# Patient Record
Sex: MALE | Race: WHITE | NOT HISPANIC OR LATINO | Employment: FULL TIME | URBAN - METROPOLITAN AREA
[De-identification: names, ages, dates, MRNs, and addresses within clinical notes are randomized per-mention and may not be internally consistent; named-entity substitution may affect disease eponyms.]

---

## 2020-10-04 ENCOUNTER — APPOINTMENT (OUTPATIENT)
Dept: CT IMAGING | Facility: CLINIC | Age: 25
End: 2020-10-04
Attending: EMERGENCY MEDICINE
Payer: COMMERCIAL

## 2020-10-04 ENCOUNTER — HOSPITAL ENCOUNTER (EMERGENCY)
Facility: CLINIC | Age: 25
Discharge: HOME | End: 2020-10-04
Attending: EMERGENCY MEDICINE | Admitting: EMERGENCY MEDICINE
Payer: COMMERCIAL

## 2020-10-04 ENCOUNTER — HOSPITAL ENCOUNTER (EMERGENCY)
Facility: CLINIC | Age: 25
Discharge: STILL ADMITTED OR ADMINISTRATIVE | End: 2020-10-04
Attending: EMERGENCY MEDICINE

## 2020-10-04 VITALS
OXYGEN SATURATION: 98 % | HEIGHT: 70 IN | TEMPERATURE: 98.8 F | WEIGHT: 191.8 LBS | SYSTOLIC BLOOD PRESSURE: 127 MMHG | RESPIRATION RATE: 10 BRPM | HEART RATE: 94 BPM | BODY MASS INDEX: 27.46 KG/M2 | DIASTOLIC BLOOD PRESSURE: 77 MMHG

## 2020-10-04 DIAGNOSIS — F10.920 ALCOHOLIC INTOXICATION WITHOUT COMPLICATION (H): ICD-10-CM

## 2020-10-04 DIAGNOSIS — S00.03XA CONTUSION OF FACE, SCALP AND NECK, INITIAL ENCOUNTER: ICD-10-CM

## 2020-10-04 DIAGNOSIS — S10.93XA CONTUSION OF FACE, SCALP AND NECK, INITIAL ENCOUNTER: ICD-10-CM

## 2020-10-04 DIAGNOSIS — S00.83XA CONTUSION OF FACE, SCALP AND NECK, INITIAL ENCOUNTER: ICD-10-CM

## 2020-10-04 LAB
ANION GAP SERPL CALCULATED.3IONS-SCNC: 11 MMOL/L (ref 3–14)
BASOPHILS # BLD AUTO: 0 10E9/L (ref 0–0.2)
BASOPHILS NFR BLD AUTO: 0.2 %
BUN SERPL-MCNC: 30 MG/DL (ref 7–30)
CALCIUM SERPL-MCNC: 7.7 MG/DL (ref 8.5–10.1)
CHLORIDE SERPL-SCNC: 110 MMOL/L (ref 94–109)
CO2 SERPL-SCNC: 20 MMOL/L (ref 20–32)
CREAT SERPL-MCNC: 1.13 MG/DL (ref 0.66–1.25)
DIFFERENTIAL METHOD BLD: ABNORMAL
EOSINOPHIL # BLD AUTO: 0.1 10E9/L (ref 0–0.7)
EOSINOPHIL NFR BLD AUTO: 1 %
ERYTHROCYTE [DISTWIDTH] IN BLOOD BY AUTOMATED COUNT: 13.3 % (ref 10–15)
ETHANOL SERPL-MCNC: 0.24 G/DL
GFR SERPL CREATININE-BSD FRML MDRD: 90 ML/MIN/{1.73_M2}
GLUCOSE SERPL-MCNC: 130 MG/DL (ref 70–99)
HCT VFR BLD AUTO: 43.7 % (ref 40–53)
HGB BLD-MCNC: 14.8 G/DL (ref 13.3–17.7)
IMM GRANULOCYTES # BLD: 0.1 10E9/L (ref 0–0.4)
IMM GRANULOCYTES NFR BLD: 0.4 %
LYMPHOCYTES # BLD AUTO: 4 10E9/L (ref 0.8–5.3)
LYMPHOCYTES NFR BLD AUTO: 31.6 %
MCH RBC QN AUTO: 30.3 PG (ref 26.5–33)
MCHC RBC AUTO-ENTMCNC: 33.9 G/DL (ref 31.5–36.5)
MCV RBC AUTO: 90 FL (ref 78–100)
MONOCYTES # BLD AUTO: 0.8 10E9/L (ref 0–1.3)
MONOCYTES NFR BLD AUTO: 6.5 %
NEUTROPHILS # BLD AUTO: 7.6 10E9/L (ref 1.6–8.3)
NEUTROPHILS NFR BLD AUTO: 60.3 %
NRBC # BLD AUTO: 0 10*3/UL
NRBC BLD AUTO-RTO: 0 /100
PLATELET # BLD AUTO: 322 10E9/L (ref 150–450)
POTASSIUM SERPL-SCNC: 3.3 MMOL/L (ref 3.4–5.3)
RBC # BLD AUTO: 4.88 10E12/L (ref 4.4–5.9)
SODIUM SERPL-SCNC: 141 MMOL/L (ref 133–144)
WBC # BLD AUTO: 12.6 10E9/L (ref 4–11)

## 2020-10-04 PROCEDURE — 250N000011 HC RX IP 250 OP 636: Performed by: EMERGENCY MEDICINE

## 2020-10-04 PROCEDURE — 272N000047 HC ADHESIVE DERMABOND SKIN

## 2020-10-04 PROCEDURE — 70486 CT MAXILLOFACIAL W/O DYE: CPT

## 2020-10-04 PROCEDURE — 70450 CT HEAD/BRAIN W/O DYE: CPT

## 2020-10-04 PROCEDURE — 80048 BASIC METABOLIC PNL TOTAL CA: CPT | Performed by: EMERGENCY MEDICINE

## 2020-10-04 PROCEDURE — 72125 CT NECK SPINE W/O DYE: CPT

## 2020-10-04 PROCEDURE — 96372 THER/PROPH/DIAG INJ SC/IM: CPT | Performed by: EMERGENCY MEDICINE

## 2020-10-04 PROCEDURE — 250N000009 HC RX 250

## 2020-10-04 PROCEDURE — 80320 DRUG SCREEN QUANTALCOHOLS: CPT | Performed by: EMERGENCY MEDICINE

## 2020-10-04 PROCEDURE — 96372 THER/PROPH/DIAG INJ SC/IM: CPT

## 2020-10-04 PROCEDURE — 99285 EMERGENCY DEPT VISIT HI MDM: CPT | Mod: 25

## 2020-10-04 PROCEDURE — 85025 COMPLETE CBC W/AUTO DIFF WBC: CPT | Performed by: EMERGENCY MEDICINE

## 2020-10-04 PROCEDURE — 12011 RPR F/E/E/N/L/M 2.5 CM/<: CPT

## 2020-10-04 RX ORDER — OLANZAPINE 10 MG/2ML
10 INJECTION, POWDER, FOR SOLUTION INTRAMUSCULAR ONCE
Status: COMPLETED | OUTPATIENT
Start: 2020-10-04 | End: 2020-10-04

## 2020-10-04 RX ORDER — METHYLCELLULOSE 4000CPS 30 %
POWDER (GRAM) MISCELLANEOUS ONCE
Status: DISCONTINUED | OUTPATIENT
Start: 2020-10-04 | End: 2020-10-04 | Stop reason: HOSPADM

## 2020-10-04 RX ORDER — WATER 10 ML/10ML
INJECTION INTRAMUSCULAR; INTRAVENOUS; SUBCUTANEOUS
Status: COMPLETED
Start: 2020-10-04 | End: 2020-10-04

## 2020-10-04 RX ADMIN — OLANZAPINE 10 MG: 10 INJECTION, POWDER, FOR SOLUTION INTRAMUSCULAR at 03:18

## 2020-10-04 RX ADMIN — WATER 2.1 ML: 1 INJECTION INTRAMUSCULAR; INTRAVENOUS; SUBCUTANEOUS at 03:18

## 2020-10-04 ASSESSMENT — MIFFLIN-ST. JEOR: SCORE: 1866.25

## 2020-10-04 NOTE — ED NOTES
"Went to pt room for morning vitals, assessment and breakfast plan. Pt was sitting on the edge of the bed awake and states \"I need to take a piss, the damn nurse won't let me go. My fucking knee hurts\" This RN allowed pt to ambulate to bathroom. Pt steady on feet and able to walk to bathroom w/out issues. MD notified of pt current status.  "
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
## 2020-10-04 NOTE — ED NOTES
"Patient laying in bed not interacting with anyone.  Then started crying.    Patient then started yelling \"FUCK!  FUCK!\"  Hitting the side rail of the ED cart with this right hand.  Yelling profanities at staff \"suck my cock, fuck you!\"  Motioning with his middle finger.   "
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
## 2020-10-04 NOTE — ED NOTES
"Patient placed into restraints.  Offered Zyprexa.  Patient yelling \"No!  I want benzos!  Give me some benzos!  I want to get knocked out man!\"  Attempted to explain to patient he can have the Zyprexa injection voluntarily or he would have to be restrained.  Patient not cooperative with either.  \"Go ahead, just fuckening do whatever man!  DO IT!\"  Then he would laugh loudly & yell profanities.  \"Come on!  Shoot me up!\"  5 point restraints placed without incident with multiple security, EDT, & RN.   "

## 2020-10-04 NOTE — ED AVS SNAPSHOT
[facility] Emergency Dept  [address] [address]  Phone: [phone]  Fax: [phone]                                    Benny Fofana   MRN: [id number]    Department: [facility] Emergency Dept   Date of Visit: 10/4/2020           After Visit Summary Signature Page    I have received my discharge instructions, and my questions have been answered. I have discussed any challenges I see with this plan with the nurse or doctor.    ..........................................................................................................................................  Patient/Patient Representative Signature      ..........................................................................................................................................  Patient Representative Print Name and Relationship to Patient    ..................................................               ................................................  Date                                   Time    ..........................................................................................................................................  Reviewed by Signature/Title    ...................................................              ..............................................  Date                                               Time          22EPIC Rev 08/18

## 2020-10-04 NOTE — ED NOTES
"Patient arguing about using urinal at side of bed & having someone assist him with standing.  Patient yelling \"no, I want to walk to the bathroom!  Or I will lay to the side & piss on the floor!  Me being a taxpayer will clean it up!\"  Attempted to explain to him why he is not being allowed to walk to the bathroom at this time.  Stand at side of bed only for now.  Patient continues to yell & threaten to \"piss on you & the floor\".      "
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
## 2020-10-04 NOTE — ED NOTES
Pt. provided with urinal. Pt. refusing to use urinal, would like to walk to the bathroom. RN notified.

## 2024-05-18 ENCOUNTER — APPOINTMENT (OUTPATIENT)
Dept: GENERAL RADIOLOGY | Facility: CLINIC | Age: 29
End: 2024-05-18
Attending: EMERGENCY MEDICINE

## 2024-05-18 ENCOUNTER — HOSPITAL ENCOUNTER (EMERGENCY)
Facility: CLINIC | Age: 29
Discharge: HOME | End: 2024-05-18
Attending: EMERGENCY MEDICINE | Admitting: EMERGENCY MEDICINE

## 2024-05-18 VITALS
BODY MASS INDEX: 28.44 KG/M2 | RESPIRATION RATE: 16 BRPM | DIASTOLIC BLOOD PRESSURE: 70 MMHG | TEMPERATURE: 97.4 F | OXYGEN SATURATION: 95 % | HEIGHT: 72 IN | WEIGHT: 210 LBS | HEART RATE: 85 BPM | SYSTOLIC BLOOD PRESSURE: 121 MMHG

## 2024-05-18 DIAGNOSIS — R07.9 ACUTE CHEST PAIN: ICD-10-CM

## 2024-05-18 DIAGNOSIS — F15.10 METHAMPHETAMINE USE (H): ICD-10-CM

## 2024-05-18 LAB
ALBUMIN SERPL BCG-MCNC: 4.6 G/DL (ref 3.5–5.2)
ALP SERPL-CCNC: 71 U/L (ref 40–150)
ALT SERPL W P-5'-P-CCNC: 47 U/L (ref 0–70)
ANION GAP SERPL CALCULATED.3IONS-SCNC: 13 MMOL/L (ref 7–15)
AST SERPL W P-5'-P-CCNC: 32 U/L (ref 0–45)
ATRIAL RATE - MUSE: 107 BPM
BASOPHILS # BLD AUTO: 0.1 10E3/UL (ref 0–0.2)
BASOPHILS NFR BLD AUTO: 0 %
BILIRUB SERPL-MCNC: 0.5 MG/DL
BUN SERPL-MCNC: 16.1 MG/DL (ref 6–20)
CALCIUM SERPL-MCNC: 9.1 MG/DL (ref 8.6–10)
CHLORIDE SERPL-SCNC: 101 MMOL/L (ref 98–107)
CREAT SERPL-MCNC: 1.16 MG/DL (ref 0.67–1.17)
DEPRECATED HCO3 PLAS-SCNC: 22 MMOL/L (ref 22–29)
DIASTOLIC BLOOD PRESSURE - MUSE: NORMAL MMHG
EGFRCR SERPLBLD CKD-EPI 2021: 88 ML/MIN/1.73M2
EOSINOPHIL # BLD AUTO: 0.1 10E3/UL (ref 0–0.7)
EOSINOPHIL NFR BLD AUTO: 1 %
ERYTHROCYTE [DISTWIDTH] IN BLOOD BY AUTOMATED COUNT: 13.3 % (ref 10–15)
GLUCOSE SERPL-MCNC: 102 MG/DL (ref 70–99)
HCT VFR BLD AUTO: 48 % (ref 40–53)
HGB BLD-MCNC: 16.4 G/DL (ref 13.3–17.7)
HOLD SPECIMEN: NORMAL
IMM GRANULOCYTES # BLD: 0 10E3/UL
IMM GRANULOCYTES NFR BLD: 0 %
INTERPRETATION ECG - MUSE: NORMAL
LYMPHOCYTES # BLD AUTO: 1.7 10E3/UL (ref 0.8–5.3)
LYMPHOCYTES NFR BLD AUTO: 14 %
MCH RBC QN AUTO: 29.5 PG (ref 26.5–33)
MCHC RBC AUTO-ENTMCNC: 34.2 G/DL (ref 31.5–36.5)
MCV RBC AUTO: 86 FL (ref 78–100)
MONOCYTES # BLD AUTO: 1.2 10E3/UL (ref 0–1.3)
MONOCYTES NFR BLD AUTO: 10 %
NEUTROPHILS # BLD AUTO: 8.7 10E3/UL (ref 1.6–8.3)
NEUTROPHILS NFR BLD AUTO: 75 %
NRBC # BLD AUTO: 0 10E3/UL
NRBC BLD AUTO-RTO: 0 /100
P AXIS - MUSE: 65 DEGREES
PLATELET # BLD AUTO: 315 10E3/UL (ref 150–450)
POTASSIUM SERPL-SCNC: 3.6 MMOL/L (ref 3.4–5.3)
PR INTERVAL - MUSE: 142 MS
PROT SERPL-MCNC: 7.4 G/DL (ref 6.4–8.3)
QRS DURATION - MUSE: 86 MS
QT - MUSE: 336 MS
QTC - MUSE: 448 MS
R AXIS - MUSE: 40 DEGREES
RBC # BLD AUTO: 5.56 10E6/UL (ref 4.4–5.9)
SODIUM SERPL-SCNC: 136 MMOL/L (ref 135–145)
SYSTOLIC BLOOD PRESSURE - MUSE: NORMAL MMHG
T AXIS - MUSE: 39 DEGREES
TROPONIN T SERPL HS-MCNC: 9 NG/L
VENTRICULAR RATE- MUSE: 107 BPM
WBC # BLD AUTO: 11.7 10E3/UL (ref 4–11)

## 2024-05-18 PROCEDURE — 71046 X-RAY EXAM CHEST 2 VIEWS: CPT

## 2024-05-18 PROCEDURE — 84484 ASSAY OF TROPONIN QUANT: CPT | Performed by: EMERGENCY MEDICINE

## 2024-05-18 PROCEDURE — 96360 HYDRATION IV INFUSION INIT: CPT

## 2024-05-18 PROCEDURE — 80053 COMPREHEN METABOLIC PANEL: CPT | Performed by: EMERGENCY MEDICINE

## 2024-05-18 PROCEDURE — 93005 ELECTROCARDIOGRAM TRACING: CPT

## 2024-05-18 PROCEDURE — 258N000003 HC RX IP 258 OP 636: Performed by: EMERGENCY MEDICINE

## 2024-05-18 PROCEDURE — 99285 EMERGENCY DEPT VISIT HI MDM: CPT | Mod: 25

## 2024-05-18 PROCEDURE — 36415 COLL VENOUS BLD VENIPUNCTURE: CPT | Performed by: EMERGENCY MEDICINE

## 2024-05-18 PROCEDURE — 85041 AUTOMATED RBC COUNT: CPT | Performed by: EMERGENCY MEDICINE

## 2024-05-18 RX ORDER — TELMISARTAN 80 MG/1
80 TABLET ORAL DAILY
COMMUNITY
Start: 2023-08-31 | End: 2024-08-30

## 2024-05-18 RX ORDER — ASPIRIN 81 MG/1
1 TABLET ORAL DAILY
COMMUNITY

## 2024-05-18 RX ADMIN — SODIUM CHLORIDE 1000 ML: 9 INJECTION, SOLUTION INTRAVENOUS at 03:47

## 2024-05-18 ASSESSMENT — COLUMBIA-SUICIDE SEVERITY RATING SCALE - C-SSRS
1. IN THE PAST MONTH, HAVE YOU WISHED YOU WERE DEAD OR WISHED YOU COULD GO TO SLEEP AND NOT WAKE UP?: NO
6. HAVE YOU EVER DONE ANYTHING, STARTED TO DO ANYTHING, OR PREPARED TO DO ANYTHING TO END YOUR LIFE?: NO
2. HAVE YOU ACTUALLY HAD ANY THOUGHTS OF KILLING YOURSELF IN THE PAST MONTH?: NO

## 2024-05-18 ASSESSMENT — ACTIVITIES OF DAILY LIVING (ADL)
ADLS_ACUITY_SCORE: 35
ADLS_ACUITY_SCORE: 35

## 2024-05-18 NOTE — ED TRIAGE NOTES
Pt reports tightness across chest, sob and palpitations since midnight. Pt also reports he was sober from meth for a year, but relapsed the past couple days and feels dehydrated     Triage Assessment (Adult)       Row Name 05/18/24 0323          Respiratory WDL    Respiratory WDL WDL        Cardiac WDL    Cardiac WDL X  tightness across chest        Cognitive/Neuro/Behavioral WDL    Cognitive/Neuro/Behavioral WDL WDL

## 2024-05-18 NOTE — ED PROVIDER NOTES
History   Chief Complaint:  Chest pain    HPI   Benny Fofana is a 28 year old male who presents for evaluation of chest pain.  He has a history of methamphetamine use, he was sober for a year but relapsed in the last few days, he injects it in his antecubital fossae.  He did this earlier in the evening, starting around 11 PM he developed chest pain in his left chest, it is worse when he moves his left arm across his chest or when he tries to lift his left arm up.  No fevers.  No trauma.  No leg swelling or history of DVT or PE.  No history of heart problems.  He has not taken any pain medication.  He states he was looking at his Apple watch while intoxicated on meth, he was puzzled by the tracings, though states that his heart rate was around 80.  No recent trouble breathing or cough.  No abdominal pain.  He states he has been through treatment programs for his meth use before and declines my offer of any resources in this regard.  No syncope.  He reports he has been compliant with his telmisartan for hypertension.  He is a [employer] supervisor.  He volunteers that he also uses testosterone.    Medications:    telmisartan (MICARDIS) 80 MG tablet  aspirin 81 MG EC tablet    Past Medical History:    Past Medical History:   Diagnosis Date    Hypertension      Physical Exam   Patient Vitals for the past 24 hrs:   BP Temp Pulse Resp SpO2 Height Weight   05/18/24 0429 121/70 -- 85 16 95 % -- --   05/18/24 0359 124/85 -- 83 23 93 % -- --   05/18/24 0344 117/67 -- 92 20 96 % -- --   05/18/24 0327 128/84 97.4  F (36.3  C) 98 16 99 % 1.829 m (6') 95.3 kg (210 lb)      Physical Exam  General: Nontoxic-appearing male recumbent in the gurney in room 12  HENT: mucous membranes moist  CV: rate as above, regular rhythm, no lower extremity edema, no JVD, palpable symmetric radial pulses, no murmur audible  Resp: normal effort, speaks in full phrases, no stridor, no cough observed  GI: abdomen soft and nontender, no guarding,  negative Menezes's sign  MSK: Mild tenderness to chest over the pectoral region, discomfort provoked with moving his left arm, no palpable shoulder or elbow effusion, no palpable bony deformity, no chest crepitus, left clavicle nontender  Skin: appropriately warm and dry, no erythema or vesicles to chest wall or left arm  Scars without evidence of infection to bilateral antecubital fossae  Neuro: alert, clear speech, oriented  Psych: cooperative, slightly anxious, no hallucinations    Emergency Department Course   Electrocardiogram  ECG taken at 0329, ECG interpreted at 0338 by HUMBERTO Shirley MD  Sinus rhythm  Rate 107 bpm. IA interval 142. QRS duration 86. QTc 448    Imaging:  XR Chest 2 Views   Final Result   IMPRESSION: Negative chest.         Laboratory:  Labs Ordered and Resulted from Time of ED Arrival to Time of ED Departure   COMPREHENSIVE METABOLIC PANEL - Abnormal       Result Value    Sodium 136      Potassium 3.6      Carbon Dioxide (CO2) 22      Anion Gap 13      Urea Nitrogen 16.1      Creatinine 1.16      GFR Estimate 88      Calcium 9.1      Chloride 101      Glucose 102 (*)     Alkaline Phosphatase 71      AST 32      ALT 47      Protein Total 7.4      Albumin 4.6      Bilirubin Total 0.5     CBC WITH PLATELETS AND DIFFERENTIAL - Abnormal    WBC Count 11.7 (*)     RBC Count 5.56      Hemoglobin 16.4      Hematocrit 48.0      MCV 86      MCH 29.5      MCHC 34.2      RDW 13.3      Platelet Count 315      % Neutrophils 75      % Lymphocytes 14      % Monocytes 10      % Eosinophils 1      % Basophils 0      % Immature Granulocytes 0      NRBCs per 100 WBC 0      Absolute Neutrophils 8.7 (*)     Absolute Lymphocytes 1.7      Absolute Monocytes 1.2      Absolute Eosinophils 0.1      Absolute Basophils 0.1      Absolute Immature Granulocytes 0.0      Absolute NRBCs 0.0     TROPONIN T, HIGH SENSITIVITY - Normal    Troponin T, High Sensitivity 9     TROPONIN T, HIGH SENSITIVITY      Emergency Department  Course:  Reviewed:  I reviewed nursing notes, vitals, and past medical history    Assessments/Consultations/Discussion of Management or Tests :  I obtained history and examined the patient as noted above.   ED Course as of 05/18/24 0529   Sat May 18, 2024   0500 I rechecked patient.       Independent Interpretation (X-rays, CTs, rhythm strip):  I personally reviewed CXR images, I see no large PTX.    Interventions:  Medications   sodium chloride 0.9% BOLUS 1,000 mL (0 mLs Intravenous Stopped 5/18/24 3962)      Social Determinants of Health affecting care:   Healthcare Access/Compliance    Disposition:  Discharged    Impression & Plan    Medical Decision Making:  Regarding his acute chest pain, I certainly considered whether this might represent an acute coronary syndrome, his age of 28 years old makes this less likely though his stimulant abuse does place him at higher than average risk of cardiovascular complications.  He was initially minimally tachycardic, and therefore cannot be ruled out for pulmonary embolism by PERC alone but in this context, with pain worse with movement of his left arm and tenderness to the left chest, I think this is likely musculoskeletal etiology and did not feel that formal evaluation for PE was indicated, noting that his tachycardia resolved very early in his ED course and did not recur, and he does not have other vital signs suggestive of a PE.  Chest x-ray shows no pneumothorax.  I considered aortic dissection though his radial pulses are good, chest x-ray has no widened mediastinum, and his symptoms resolved.  He has no evidence of clinical intoxication at this time.  I did explain that with troponin drawn less than 6 hours from symptom onset, I cannot absolutely rule out an acute coronary syndrome though with a normal troponin, very low clinical suspicion, and a much more likely alternate etiology, I do think that his preference for discharge rather than additional monitoring and  repeat laboratory studies here in the emergency department is reasonable and I do not think he needs to leave AMA, though the diagnostic limitation was reviewed and acknowledged by the patient.  I specifically asked him to return here for acute troubles at any hour.  He is asymptomatic at the time of discharge.      Diagnosis:    ICD-10-CM    1. Acute chest pain  R07.9       2. Methamphetamine use (H)  F15.10          5/18/2024   MD Casper Brown Jeffrey Alan, MD  05/18/24 0532

## 2025-01-01 ENCOUNTER — HOSPITAL ENCOUNTER (INPATIENT)
Facility: CLINIC | Age: 30
DRG: 580 | End: 2025-01-01
Attending: EMERGENCY MEDICINE | Admitting: STUDENT IN AN ORGANIZED HEALTH CARE EDUCATION/TRAINING PROGRAM

## 2025-01-01 DIAGNOSIS — L03.114 CELLULITIS OF LEFT HAND: ICD-10-CM

## 2025-01-01 DIAGNOSIS — M65.949 FLEXOR TENOSYNOVITIS OF FINGER: ICD-10-CM
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
## 2025-01-01 LAB
ANION GAP SERPL CALCULATED.3IONS-SCNC: 15 MMOL/L (ref 7–15)
BASE EXCESS BLDV CALC-SCNC: 1 MMOL/L (ref -3–3)
BASOPHILS # BLD AUTO: 0.1 10E3/UL (ref 0–0.2)
BASOPHILS NFR BLD AUTO: 0 %
BUN SERPL-MCNC: 14.2 MG/DL (ref 6–20)
CALCIUM SERPL-MCNC: 9.5 MG/DL (ref 8.8–10.4)
CHLORIDE SERPL-SCNC: 96 MMOL/L (ref 98–107)
CREAT SERPL-MCNC: 1.02 MG/DL (ref 0.67–1.17)
EGFRCR SERPLBLD CKD-EPI 2021: >90 ML/MIN/1.73M2
EOSINOPHIL # BLD AUTO: 0.2 10E3/UL (ref 0–0.7)
EOSINOPHIL NFR BLD AUTO: 1 %
ERYTHROCYTE [DISTWIDTH] IN BLOOD BY AUTOMATED COUNT: 14.2 % (ref 10–15)
GLUCOSE SERPL-MCNC: 83 MG/DL (ref 70–99)
HCO3 BLDV-SCNC: 26 MMOL/L (ref 21–28)
HCO3 SERPL-SCNC: 24 MMOL/L (ref 22–29)
HCT VFR BLD AUTO: 49.4 % (ref 40–53)
HGB BLD-MCNC: 17.2 G/DL (ref 13.3–17.7)
IMM GRANULOCYTES # BLD: 0.1 10E3/UL
IMM GRANULOCYTES NFR BLD: 0 %
LACTATE BLD-SCNC: 0.8 MMOL/L
LYMPHOCYTES # BLD AUTO: 2.9 10E3/UL (ref 0.8–5.3)
LYMPHOCYTES NFR BLD AUTO: 15 %
MCH RBC QN AUTO: 30.6 PG (ref 26.5–33)
MCHC RBC AUTO-ENTMCNC: 34.8 G/DL (ref 31.5–36.5)
MCV RBC AUTO: 88 FL (ref 78–100)
MONOCYTES # BLD AUTO: 1.6 10E3/UL (ref 0–1.3)
MONOCYTES NFR BLD AUTO: 8 %
NEUTROPHILS # BLD AUTO: 13.9 10E3/UL (ref 1.6–8.3)
NEUTROPHILS NFR BLD AUTO: 75 %
NRBC # BLD AUTO: 0 10E3/UL
NRBC BLD AUTO-RTO: 0 /100
PCO2 BLDV: 42 MM HG (ref 40–50)
PH BLDV: 7.41 [PH] (ref 7.32–7.43)
PLAT MORPH BLD: ABNORMAL
PLATELET # BLD AUTO: 368 10E3/UL (ref 150–450)
PO2 BLDV: 63 MM HG (ref 25–47)
POTASSIUM SERPL-SCNC: 4.2 MMOL/L (ref 3.4–5.3)
RBC # BLD AUTO: 5.63 10E6/UL (ref 4.4–5.9)
RBC MORPH BLD: ABNORMAL
SAO2 % BLDV: 92 % (ref 70–75)
SODIUM SERPL-SCNC: 135 MMOL/L (ref 135–145)
VARIANT LYMPHS BLD QL SMEAR: PRESENT
WBC # BLD AUTO: 18.7 10E3/UL (ref 4–11)

## 2025-01-01 PROCEDURE — 258N000003 HC RX IP 258 OP 636: Performed by: EMERGENCY MEDICINE

## 2025-01-01 PROCEDURE — 85004 AUTOMATED DIFF WBC COUNT: CPT | Performed by: STUDENT IN AN ORGANIZED HEALTH CARE EDUCATION/TRAINING PROGRAM

## 2025-01-01 PROCEDURE — 99222 1ST HOSP IP/OBS MODERATE 55: CPT | Performed by: STUDENT IN AN ORGANIZED HEALTH CARE EDUCATION/TRAINING PROGRAM

## 2025-01-01 PROCEDURE — 85048 AUTOMATED LEUKOCYTE COUNT: CPT | Performed by: STUDENT IN AN ORGANIZED HEALTH CARE EDUCATION/TRAINING PROGRAM

## 2025-01-01 PROCEDURE — 120N000001 HC R&B MED SURG/OB

## 2025-01-01 PROCEDURE — 80048 BASIC METABOLIC PNL TOTAL CA: CPT | Performed by: STUDENT IN AN ORGANIZED HEALTH CARE EDUCATION/TRAINING PROGRAM

## 2025-01-01 PROCEDURE — 82803 BLOOD GASES ANY COMBINATION: CPT

## 2025-01-01 PROCEDURE — 36415 COLL VENOUS BLD VENIPUNCTURE: CPT | Performed by: STUDENT IN AN ORGANIZED HEALTH CARE EDUCATION/TRAINING PROGRAM

## 2025-01-01 PROCEDURE — 99285 EMERGENCY DEPT VISIT HI MDM: CPT

## 2025-01-01 PROCEDURE — 87040 BLOOD CULTURE FOR BACTERIA: CPT | Performed by: STUDENT IN AN ORGANIZED HEALTH CARE EDUCATION/TRAINING PROGRAM

## 2025-01-01 PROCEDURE — 250N000011 HC RX IP 250 OP 636: Performed by: STUDENT IN AN ORGANIZED HEALTH CARE EDUCATION/TRAINING PROGRAM

## 2025-01-01 PROCEDURE — 85014 HEMATOCRIT: CPT | Performed by: STUDENT IN AN ORGANIZED HEALTH CARE EDUCATION/TRAINING PROGRAM

## 2025-01-01 RX ORDER — METRONIDAZOLE 500 MG/100ML
500 INJECTION, SOLUTION INTRAVENOUS EVERY 12 HOURS
Status: DISCONTINUED | OUTPATIENT
Start: 2025-01-02 | End: 2025-01-02

## 2025-01-01 RX ORDER — METRONIDAZOLE 500 MG/100ML
500 INJECTION, SOLUTION INTRAVENOUS ONCE
Status: COMPLETED | OUTPATIENT
Start: 2025-01-01 | End: 2025-01-01

## 2025-01-01 RX ORDER — HYDROMORPHONE HCL IN WATER/PF 6 MG/30 ML
0.4 PATIENT CONTROLLED ANALGESIA SYRINGE INTRAVENOUS
Status: DISCONTINUED | OUTPATIENT
Start: 2025-01-01 | End: 2025-01-02

## 2025-01-01 RX ORDER — POLYETHYLENE GLYCOL 3350 17 G/17G
17 POWDER, FOR SOLUTION ORAL 2 TIMES DAILY PRN
Status: DISCONTINUED | OUTPATIENT
Start: 2025-01-01 | End: 2025-01-05 | Stop reason: HOSPADM

## 2025-01-01 RX ORDER — LEVOFLOXACIN 5 MG/ML
500 INJECTION, SOLUTION INTRAVENOUS EVERY 24 HOURS
Status: DISCONTINUED | OUTPATIENT
Start: 2025-01-01 | End: 2025-01-02

## 2025-01-01 RX ORDER — OXYCODONE HYDROCHLORIDE 5 MG/1
5 TABLET ORAL EVERY 4 HOURS PRN
Status: DISCONTINUED | OUTPATIENT
Start: 2025-01-01 | End: 2025-01-02

## 2025-01-01 RX ORDER — IBUPROFEN 600 MG/1
600 TABLET, FILM COATED ORAL EVERY 6 HOURS PRN
Status: DISCONTINUED | OUTPATIENT
Start: 2025-01-01 | End: 2025-01-03

## 2025-01-01 RX ORDER — AMOXICILLIN 250 MG
1 CAPSULE ORAL 2 TIMES DAILY PRN
Status: DISCONTINUED | OUTPATIENT
Start: 2025-01-01 | End: 2025-01-05 | Stop reason: HOSPADM

## 2025-01-01 RX ORDER — LEVOFLOXACIN 5 MG/ML
750 INJECTION, SOLUTION INTRAVENOUS ONCE
Status: COMPLETED | OUTPATIENT
Start: 2025-01-01 | End: 2025-01-01

## 2025-01-01 RX ORDER — ONDANSETRON 4 MG/1
4 TABLET, ORALLY DISINTEGRATING ORAL EVERY 6 HOURS PRN
Status: DISCONTINUED | OUTPATIENT
Start: 2025-01-01 | End: 2025-01-05 | Stop reason: HOSPADM

## 2025-01-01 RX ORDER — PROCHLORPERAZINE MALEATE 10 MG
10 TABLET ORAL EVERY 6 HOURS PRN
Status: DISCONTINUED | OUTPATIENT
Start: 2025-01-01 | End: 2025-01-05 | Stop reason: HOSPADM

## 2025-01-01 RX ORDER — ACETAMINOPHEN 325 MG/1
650 TABLET ORAL EVERY 4 HOURS PRN
Status: DISCONTINUED | OUTPATIENT
Start: 2025-01-01 | End: 2025-01-05 | Stop reason: HOSPADM

## 2025-01-01 RX ORDER — ACETAMINOPHEN 650 MG/1
650 SUPPOSITORY RECTAL EVERY 4 HOURS PRN
Status: DISCONTINUED | OUTPATIENT
Start: 2025-01-01 | End: 2025-01-05 | Stop reason: HOSPADM

## 2025-01-01 RX ORDER — ONDANSETRON 2 MG/ML
4 INJECTION INTRAMUSCULAR; INTRAVENOUS EVERY 6 HOURS PRN
Status: DISCONTINUED | OUTPATIENT
Start: 2025-01-01 | End: 2025-01-05 | Stop reason: HOSPADM

## 2025-01-01 RX ORDER — LIDOCAINE 40 MG/G
CREAM TOPICAL
Status: DISCONTINUED | OUTPATIENT
Start: 2025-01-01 | End: 2025-01-03

## 2025-01-01 RX ORDER — HYDROMORPHONE HCL IN WATER/PF 6 MG/30 ML
0.2 PATIENT CONTROLLED ANALGESIA SYRINGE INTRAVENOUS
Status: DISCONTINUED | OUTPATIENT
Start: 2025-01-01 | End: 2025-01-02

## 2025-01-01 RX ORDER — HYDRALAZINE HYDROCHLORIDE 20 MG/ML
10 INJECTION INTRAMUSCULAR; INTRAVENOUS EVERY 4 HOURS PRN
Status: DISCONTINUED | OUTPATIENT
Start: 2025-01-01 | End: 2025-01-05 | Stop reason: HOSPADM

## 2025-01-01 RX ORDER — LABETALOL HYDROCHLORIDE 5 MG/ML
10 INJECTION, SOLUTION INTRAVENOUS
Status: DISCONTINUED | OUTPATIENT
Start: 2025-01-01 | End: 2025-01-05 | Stop reason: HOSPADM

## 2025-01-01 RX ORDER — CALCIUM CARBONATE 500 MG/1
1000 TABLET, CHEWABLE ORAL 4 TIMES DAILY PRN
Status: DISCONTINUED | OUTPATIENT
Start: 2025-01-01 | End: 2025-01-05 | Stop reason: HOSPADM

## 2025-01-01 RX ORDER — AMOXICILLIN 250 MG
2 CAPSULE ORAL 2 TIMES DAILY PRN
Status: DISCONTINUED | OUTPATIENT
Start: 2025-01-01 | End: 2025-01-05 | Stop reason: HOSPADM

## 2025-01-01 RX ORDER — CLINDAMYCIN PHOSPHATE 900 MG/50ML
900 INJECTION, SOLUTION INTRAVENOUS ONCE
Status: DISCONTINUED | OUTPATIENT
Start: 2025-01-01 | End: 2025-01-01

## 2025-01-01 RX ADMIN — METRONIDAZOLE 500 MG: 5 INJECTION, SOLUTION INTRAVENOUS at 20:48

## 2025-01-01 RX ADMIN — SODIUM CHLORIDE 1000 ML: 9 INJECTION, SOLUTION INTRAVENOUS at 21:06

## 2025-01-01 RX ADMIN — LEVOFLOXACIN 750 MG: 5 INJECTION, SOLUTION INTRAVENOUS at 20:49

## 2025-01-01 ASSESSMENT — ACTIVITIES OF DAILY LIVING (ADL)
ADLS_ACUITY_SCORE: 41

## 2025-01-01 NOTE — LETTER
Benny Fofana was seen and treated in our emergency department on 1/1/2025.  He may return to work with the following restrictions: no weightbearing of the left upper extremity, no lifting greater than the weight of a coffee cup.  Restrictions to be reevaluated at follow-up appointment.     If you have any questions or concerns, please don't hesitate to call.

## 2025-01-02 ENCOUNTER — APPOINTMENT (OUTPATIENT)
Dept: GENERAL RADIOLOGY | Facility: CLINIC | Age: 30
DRG: 580 | End: 2025-01-02
Attending: PHYSICIAN ASSISTANT

## 2025-01-02 ENCOUNTER — ANESTHESIA EVENT (OUTPATIENT)
Dept: SURGERY | Facility: CLINIC | Age: 30
End: 2025-01-02

## 2025-01-02 ENCOUNTER — ANESTHESIA (OUTPATIENT)
Dept: SURGERY | Facility: CLINIC | Age: 30
End: 2025-01-02

## 2025-01-02 VITALS
HEIGHT: 72 IN | WEIGHT: 225 LBS | TEMPERATURE: 97.7 F | DIASTOLIC BLOOD PRESSURE: 68 MMHG | BODY MASS INDEX: 30.48 KG/M2 | HEART RATE: 68 BPM | OXYGEN SATURATION: 93 % | SYSTOLIC BLOOD PRESSURE: 119 MMHG | RESPIRATION RATE: 12 BRPM

## 2025-01-02 LAB
ANION GAP SERPL CALCULATED.3IONS-SCNC: 14 MMOL/L (ref 7–15)
BACTERIA BLD CULT: NORMAL
BACTERIA BLD CULT: NORMAL
BACTERIA SPEC CULT: NORMAL
BACTERIA SPEC CULT: NORMAL
BUN SERPL-MCNC: 16.6 MG/DL (ref 6–20)
CALCIUM SERPL-MCNC: 9.3 MG/DL (ref 8.8–10.4)
CHLORIDE SERPL-SCNC: 99 MMOL/L (ref 98–107)
CREAT SERPL-MCNC: 1.23 MG/DL (ref 0.67–1.17)
CRP SERPL-MCNC: 144.46 MG/L
EGFRCR SERPLBLD CKD-EPI 2021: 82 ML/MIN/1.73M2
ERYTHROCYTE [DISTWIDTH] IN BLOOD BY AUTOMATED COUNT: 14.2 % (ref 10–15)
ERYTHROCYTE [SEDIMENTATION RATE] IN BLOOD BY WESTERGREN METHOD: 9 MM/HR (ref 0–15)
GLUCOSE SERPL-MCNC: 95 MG/DL (ref 70–99)
GRAM STAIN RESULT: NORMAL
HCO3 SERPL-SCNC: 26 MMOL/L (ref 22–29)
HCT VFR BLD AUTO: 50.9 % (ref 40–53)
HGB BLD-MCNC: 16.5 G/DL (ref 13.3–17.7)
MCH RBC QN AUTO: 29.6 PG (ref 26.5–33)
MCHC RBC AUTO-ENTMCNC: 32.4 G/DL (ref 31.5–36.5)
MCV RBC AUTO: 91 FL (ref 78–100)
MRSA DNA SPEC QL NAA+PROBE: NEGATIVE
PLATELET # BLD AUTO: 368 10E3/UL (ref 150–450)
POTASSIUM SERPL-SCNC: 4.3 MMOL/L (ref 3.4–5.3)
RBC # BLD AUTO: 5.57 10E6/UL (ref 4.4–5.9)
SA TARGET DNA: POSITIVE
SODIUM SERPL-SCNC: 139 MMOL/L (ref 135–145)
WBC # BLD AUTO: 16.8 10E3/UL (ref 4–11)

## 2025-01-02 PROCEDURE — 250N000009 HC RX 250: Performed by: ORTHOPAEDIC SURGERY

## 2025-01-02 PROCEDURE — 87205 SMEAR GRAM STAIN: CPT | Performed by: ORTHOPAEDIC SURGERY

## 2025-01-02 PROCEDURE — 250N000011 HC RX IP 250 OP 636: Performed by: PHYSICIAN ASSISTANT

## 2025-01-02 PROCEDURE — 250N000025 HC SEVOFLURANE, PER MIN: Performed by: ORTHOPAEDIC SURGERY

## 2025-01-02 PROCEDURE — 250N000011 HC RX IP 250 OP 636: Performed by: STUDENT IN AN ORGANIZED HEALTH CARE EDUCATION/TRAINING PROGRAM

## 2025-01-02 PROCEDURE — 250N000011 HC RX IP 250 OP 636: Performed by: ANESTHESIOLOGY

## 2025-01-02 PROCEDURE — 258N000003 HC RX IP 258 OP 636: Performed by: PHYSICIAN ASSISTANT

## 2025-01-02 PROCEDURE — 999N000141 HC STATISTIC PRE-PROCEDURE NURSING ASSESSMENT: Performed by: ORTHOPAEDIC SURGERY

## 2025-01-02 PROCEDURE — 250N000011 HC RX IP 250 OP 636: Performed by: NURSE ANESTHETIST, CERTIFIED REGISTERED

## 2025-01-02 PROCEDURE — 85014 HEMATOCRIT: CPT | Performed by: STUDENT IN AN ORGANIZED HEALTH CARE EDUCATION/TRAINING PROGRAM

## 2025-01-02 PROCEDURE — 250N000009 HC RX 250: Performed by: NURSE ANESTHETIST, CERTIFIED REGISTERED

## 2025-01-02 PROCEDURE — 258N000003 HC RX IP 258 OP 636: Performed by: NURSE ANESTHETIST, CERTIFIED REGISTERED

## 2025-01-02 PROCEDURE — 36415 COLL VENOUS BLD VENIPUNCTURE: CPT | Performed by: STUDENT IN AN ORGANIZED HEALTH CARE EDUCATION/TRAINING PROGRAM

## 2025-01-02 PROCEDURE — 0JBK0ZZ EXCISION OF LEFT HAND SUBCUTANEOUS TISSUE AND FASCIA, OPEN APPROACH: ICD-10-PCS | Performed by: ORTHOPAEDIC SURGERY

## 2025-01-02 PROCEDURE — 370N000017 HC ANESTHESIA TECHNICAL FEE, PER MIN: Performed by: ORTHOPAEDIC SURGERY

## 2025-01-02 PROCEDURE — 258N000003 HC RX IP 258 OP 636: Performed by: ANESTHESIOLOGY

## 2025-01-02 PROCEDURE — 73130 X-RAY EXAM OF HAND: CPT | Mod: LT

## 2025-01-02 PROCEDURE — 250N000011 HC RX IP 250 OP 636: Performed by: ORTHOPAEDIC SURGERY

## 2025-01-02 PROCEDURE — 250N000013 HC RX MED GY IP 250 OP 250 PS 637: Performed by: INTERNAL MEDICINE

## 2025-01-02 PROCEDURE — 85652 RBC SED RATE AUTOMATED: CPT | Performed by: PHYSICIAN ASSISTANT

## 2025-01-02 PROCEDURE — 99232 SBSQ HOSP IP/OBS MODERATE 35: CPT | Performed by: PHYSICIAN ASSISTANT

## 2025-01-02 PROCEDURE — 87640 STAPH A DNA AMP PROBE: CPT | Performed by: PHYSICIAN ASSISTANT

## 2025-01-02 PROCEDURE — 120N000001 HC R&B MED SURG/OB

## 2025-01-02 PROCEDURE — 87077 CULTURE AEROBIC IDENTIFY: CPT | Performed by: ORTHOPAEDIC SURGERY

## 2025-01-02 PROCEDURE — 250N000013 HC RX MED GY IP 250 OP 250 PS 637: Performed by: STUDENT IN AN ORGANIZED HEALTH CARE EDUCATION/TRAINING PROGRAM

## 2025-01-02 PROCEDURE — 258N000003 HC RX IP 258 OP 636: Performed by: STUDENT IN AN ORGANIZED HEALTH CARE EDUCATION/TRAINING PROGRAM

## 2025-01-02 PROCEDURE — 86140 C-REACTIVE PROTEIN: CPT | Performed by: PHYSICIAN ASSISTANT

## 2025-01-02 PROCEDURE — 99222 1ST HOSP IP/OBS MODERATE 55: CPT | Performed by: INTERNAL MEDICINE

## 2025-01-02 PROCEDURE — 360N000075 HC SURGERY LEVEL 2, PER MIN: Performed by: ORTHOPAEDIC SURGERY

## 2025-01-02 PROCEDURE — 272N000001 HC OR GENERAL SUPPLY STERILE: Performed by: ORTHOPAEDIC SURGERY

## 2025-01-02 PROCEDURE — 87076 CULTURE ANAEROBE IDENT EACH: CPT | Performed by: ORTHOPAEDIC SURGERY

## 2025-01-02 PROCEDURE — 87641 MR-STAPH DNA AMP PROBE: CPT | Performed by: PHYSICIAN ASSISTANT

## 2025-01-02 PROCEDURE — 82374 ASSAY BLOOD CARBON DIOXIDE: CPT | Performed by: STUDENT IN AN ORGANIZED HEALTH CARE EDUCATION/TRAINING PROGRAM

## 2025-01-02 PROCEDURE — 87070 CULTURE OTHR SPECIMN AEROBIC: CPT | Performed by: ORTHOPAEDIC SURGERY

## 2025-01-02 PROCEDURE — 80048 BASIC METABOLIC PNL TOTAL CA: CPT | Performed by: STUDENT IN AN ORGANIZED HEALTH CARE EDUCATION/TRAINING PROGRAM

## 2025-01-02 PROCEDURE — 87075 CULTR BACTERIA EXCEPT BLOOD: CPT | Performed by: ORTHOPAEDIC SURGERY

## 2025-01-02 PROCEDURE — 0LB80ZZ EXCISION OF LEFT HAND TENDON, OPEN APPROACH: ICD-10-PCS | Performed by: ORTHOPAEDIC SURGERY

## 2025-01-02 PROCEDURE — 710N000009 HC RECOVERY PHASE 1, LEVEL 1, PER MIN: Performed by: ORTHOPAEDIC SURGERY

## 2025-01-02 RX ORDER — AMOXICILLIN 250 MG
1 CAPSULE ORAL 2 TIMES DAILY
Status: DISCONTINUED | OUTPATIENT
Start: 2025-01-02 | End: 2025-01-05 | Stop reason: HOSPADM

## 2025-01-02 RX ORDER — SODIUM CHLORIDE, SODIUM LACTATE, POTASSIUM CHLORIDE, CALCIUM CHLORIDE 600; 310; 30; 20 MG/100ML; MG/100ML; MG/100ML; MG/100ML
INJECTION, SOLUTION INTRAVENOUS CONTINUOUS
Status: DISCONTINUED | OUTPATIENT
Start: 2025-01-02 | End: 2025-01-02 | Stop reason: HOSPADM

## 2025-01-02 RX ORDER — FENTANYL CITRATE 0.05 MG/ML
50 INJECTION, SOLUTION INTRAMUSCULAR; INTRAVENOUS EVERY 5 MIN PRN
Status: DISCONTINUED | OUTPATIENT
Start: 2025-01-02 | End: 2025-01-02 | Stop reason: HOSPADM

## 2025-01-02 RX ORDER — FENTANYL CITRATE 0.05 MG/ML
25 INJECTION, SOLUTION INTRAMUSCULAR; INTRAVENOUS EVERY 5 MIN PRN
Status: DISCONTINUED | OUTPATIENT
Start: 2025-01-02 | End: 2025-01-02 | Stop reason: HOSPADM

## 2025-01-02 RX ORDER — HYDROMORPHONE HCL IN WATER/PF 6 MG/30 ML
0.4 PATIENT CONTROLLED ANALGESIA SYRINGE INTRAVENOUS EVERY 5 MIN PRN
Status: DISCONTINUED | OUTPATIENT
Start: 2025-01-02 | End: 2025-01-02 | Stop reason: HOSPADM

## 2025-01-02 RX ORDER — NALOXONE HYDROCHLORIDE 0.4 MG/ML
0.2 INJECTION, SOLUTION INTRAMUSCULAR; INTRAVENOUS; SUBCUTANEOUS
Status: DISCONTINUED | OUTPATIENT
Start: 2025-01-02 | End: 2025-01-05 | Stop reason: HOSPADM

## 2025-01-02 RX ORDER — HYDROMORPHONE HCL IN WATER/PF 6 MG/30 ML
0.2 PATIENT CONTROLLED ANALGESIA SYRINGE INTRAVENOUS EVERY 5 MIN PRN
Status: DISCONTINUED | OUTPATIENT
Start: 2025-01-02 | End: 2025-01-02 | Stop reason: HOSPADM

## 2025-01-02 RX ORDER — BISACODYL 10 MG
10 SUPPOSITORY, RECTAL RECTAL DAILY PRN
Status: DISCONTINUED | OUTPATIENT
Start: 2025-01-05 | End: 2025-01-05 | Stop reason: HOSPADM

## 2025-01-02 RX ORDER — PROCHLORPERAZINE MALEATE 10 MG
10 TABLET ORAL EVERY 6 HOURS PRN
Status: DISCONTINUED | OUTPATIENT
Start: 2025-01-02 | End: 2025-01-02

## 2025-01-02 RX ORDER — LIDOCAINE 40 MG/G
CREAM TOPICAL
Status: DISCONTINUED | OUTPATIENT
Start: 2025-01-02 | End: 2025-01-05 | Stop reason: HOSPADM

## 2025-01-02 RX ORDER — POLYETHYLENE GLYCOL 3350 17 G/17G
17 POWDER, FOR SOLUTION ORAL DAILY
Status: DISCONTINUED | OUTPATIENT
Start: 2025-01-03 | End: 2025-01-05 | Stop reason: HOSPADM

## 2025-01-02 RX ORDER — NALOXONE HYDROCHLORIDE 0.4 MG/ML
0.4 INJECTION, SOLUTION INTRAMUSCULAR; INTRAVENOUS; SUBCUTANEOUS
Status: DISCONTINUED | OUTPATIENT
Start: 2025-01-02 | End: 2025-01-05 | Stop reason: HOSPADM

## 2025-01-02 RX ORDER — HYDROMORPHONE HYDROCHLORIDE 1 MG/ML
0.5 INJECTION, SOLUTION INTRAMUSCULAR; INTRAVENOUS; SUBCUTANEOUS
Status: DISCONTINUED | OUTPATIENT
Start: 2025-01-02 | End: 2025-01-05 | Stop reason: HOSPADM

## 2025-01-02 RX ORDER — PROPOFOL 10 MG/ML
INJECTION, EMULSION INTRAVENOUS PRN
Status: DISCONTINUED | OUTPATIENT
Start: 2025-01-02 | End: 2025-01-02

## 2025-01-02 RX ORDER — HYDROMORPHONE HYDROCHLORIDE 1 MG/ML
0.3 INJECTION, SOLUTION INTRAMUSCULAR; INTRAVENOUS; SUBCUTANEOUS
Status: DISCONTINUED | OUTPATIENT
Start: 2025-01-02 | End: 2025-01-05 | Stop reason: HOSPADM

## 2025-01-02 RX ORDER — LEVOFLOXACIN 500 MG/1
500 TABLET, FILM COATED ORAL EVERY 24 HOURS
Status: DISCONTINUED | OUTPATIENT
Start: 2025-01-02 | End: 2025-01-02

## 2025-01-02 RX ORDER — BUPIVACAINE HYDROCHLORIDE 2.5 MG/ML
INJECTION, SOLUTION INFILTRATION; PERINEURAL PRN
Status: DISCONTINUED | OUTPATIENT
Start: 2025-01-02 | End: 2025-01-02 | Stop reason: HOSPADM

## 2025-01-02 RX ORDER — OXYCODONE HYDROCHLORIDE 5 MG/1
5 TABLET ORAL EVERY 4 HOURS PRN
Status: DISCONTINUED | OUTPATIENT
Start: 2025-01-02 | End: 2025-01-05 | Stop reason: HOSPADM

## 2025-01-02 RX ORDER — DEXAMETHASONE SODIUM PHOSPHATE 4 MG/ML
INJECTION, SOLUTION INTRA-ARTICULAR; INTRALESIONAL; INTRAMUSCULAR; INTRAVENOUS; SOFT TISSUE PRN
Status: DISCONTINUED | OUTPATIENT
Start: 2025-01-02 | End: 2025-01-02

## 2025-01-02 RX ORDER — ONDANSETRON 4 MG/1
4 TABLET, ORALLY DISINTEGRATING ORAL EVERY 30 MIN PRN
Status: DISCONTINUED | OUTPATIENT
Start: 2025-01-02 | End: 2025-01-02 | Stop reason: HOSPADM

## 2025-01-02 RX ORDER — FENTANYL CITRATE 50 UG/ML
INJECTION, SOLUTION INTRAMUSCULAR; INTRAVENOUS PRN
Status: DISCONTINUED | OUTPATIENT
Start: 2025-01-02 | End: 2025-01-02

## 2025-01-02 RX ORDER — AMOXICILLIN 250 MG
2 CAPSULE ORAL 2 TIMES DAILY
Status: DISCONTINUED | OUTPATIENT
Start: 2025-01-02 | End: 2025-01-02 | Stop reason: DRUGHIGH

## 2025-01-02 RX ORDER — ONDANSETRON 2 MG/ML
INJECTION INTRAMUSCULAR; INTRAVENOUS PRN
Status: DISCONTINUED | OUTPATIENT
Start: 2025-01-02 | End: 2025-01-02

## 2025-01-02 RX ORDER — OXYCODONE HYDROCHLORIDE 5 MG/1
10 TABLET ORAL EVERY 4 HOURS PRN
Status: DISCONTINUED | OUTPATIENT
Start: 2025-01-02 | End: 2025-01-05 | Stop reason: HOSPADM

## 2025-01-02 RX ORDER — SODIUM CHLORIDE 9 MG/ML
INJECTION, SOLUTION INTRAVENOUS CONTINUOUS
Status: DISCONTINUED | OUTPATIENT
Start: 2025-01-02 | End: 2025-01-03

## 2025-01-02 RX ORDER — HYDROMORPHONE HYDROCHLORIDE 1 MG/ML
0.5 INJECTION, SOLUTION INTRAMUSCULAR; INTRAVENOUS; SUBCUTANEOUS ONCE
Status: COMPLETED | OUTPATIENT
Start: 2025-01-02 | End: 2025-01-02

## 2025-01-02 RX ORDER — ONDANSETRON 4 MG/1
4 TABLET, ORALLY DISINTEGRATING ORAL EVERY 6 HOURS PRN
Status: DISCONTINUED | OUTPATIENT
Start: 2025-01-02 | End: 2025-01-02

## 2025-01-02 RX ORDER — DEXAMETHASONE SODIUM PHOSPHATE 4 MG/ML
4 INJECTION, SOLUTION INTRA-ARTICULAR; INTRALESIONAL; INTRAMUSCULAR; INTRAVENOUS; SOFT TISSUE
Status: DISCONTINUED | OUTPATIENT
Start: 2025-01-02 | End: 2025-01-02 | Stop reason: HOSPADM

## 2025-01-02 RX ORDER — ONDANSETRON 2 MG/ML
4 INJECTION INTRAMUSCULAR; INTRAVENOUS EVERY 6 HOURS PRN
Status: DISCONTINUED | OUTPATIENT
Start: 2025-01-02 | End: 2025-01-02

## 2025-01-02 RX ORDER — CEFAZOLIN SODIUM/WATER 2 G/20 ML
2 SYRINGE (ML) INTRAVENOUS SEE ADMIN INSTRUCTIONS
Status: DISCONTINUED | OUTPATIENT
Start: 2025-01-02 | End: 2025-01-02 | Stop reason: HOSPADM

## 2025-01-02 RX ORDER — MAGNESIUM HYDROXIDE 1200 MG/15ML
LIQUID ORAL PRN
Status: DISCONTINUED | OUTPATIENT
Start: 2025-01-02 | End: 2025-01-02 | Stop reason: HOSPADM

## 2025-01-02 RX ORDER — DEXMEDETOMIDINE HYDROCHLORIDE 4 UG/ML
INJECTION, SOLUTION INTRAVENOUS PRN
Status: DISCONTINUED | OUTPATIENT
Start: 2025-01-02 | End: 2025-01-02

## 2025-01-02 RX ORDER — NALOXONE HYDROCHLORIDE 0.4 MG/ML
0.1 INJECTION, SOLUTION INTRAMUSCULAR; INTRAVENOUS; SUBCUTANEOUS
Status: DISCONTINUED | OUTPATIENT
Start: 2025-01-02 | End: 2025-01-02 | Stop reason: HOSPADM

## 2025-01-02 RX ORDER — ACETAMINOPHEN 500 MG
1000 TABLET ORAL 3 TIMES DAILY
Status: DISCONTINUED | OUTPATIENT
Start: 2025-01-02 | End: 2025-01-05 | Stop reason: HOSPADM

## 2025-01-02 RX ORDER — LIDOCAINE HYDROCHLORIDE 20 MG/ML
INJECTION, SOLUTION INFILTRATION; PERINEURAL PRN
Status: DISCONTINUED | OUTPATIENT
Start: 2025-01-02 | End: 2025-01-02

## 2025-01-02 RX ORDER — CEFAZOLIN SODIUM/WATER 2 G/20 ML
2 SYRINGE (ML) INTRAVENOUS
Status: COMPLETED | OUTPATIENT
Start: 2025-01-02 | End: 2025-01-02

## 2025-01-02 RX ORDER — ONDANSETRON 2 MG/ML
4 INJECTION INTRAMUSCULAR; INTRAVENOUS EVERY 30 MIN PRN
Status: DISCONTINUED | OUTPATIENT
Start: 2025-01-02 | End: 2025-01-02 | Stop reason: HOSPADM

## 2025-01-02 RX ORDER — ERTAPENEM 1 G/1
1 INJECTION, POWDER, LYOPHILIZED, FOR SOLUTION INTRAMUSCULAR; INTRAVENOUS EVERY 24 HOURS
Status: DISCONTINUED | OUTPATIENT
Start: 2025-01-02 | End: 2025-01-05 | Stop reason: HOSPADM

## 2025-01-02 RX ORDER — SODIUM CHLORIDE, SODIUM LACTATE, POTASSIUM CHLORIDE, CALCIUM CHLORIDE 600; 310; 30; 20 MG/100ML; MG/100ML; MG/100ML; MG/100ML
INJECTION, SOLUTION INTRAVENOUS CONTINUOUS PRN
Status: DISCONTINUED | OUTPATIENT
Start: 2025-01-02 | End: 2025-01-02

## 2025-01-02 RX ADMIN — OXYCODONE HYDROCHLORIDE 5 MG: 5 TABLET ORAL at 21:00

## 2025-01-02 RX ADMIN — FENTANYL CITRATE 50 MCG: 50 INJECTION, SOLUTION INTRAMUSCULAR; INTRAVENOUS at 16:39

## 2025-01-02 RX ADMIN — HYDROMORPHONE HYDROCHLORIDE 0.4 MG: 0.2 INJECTION, SOLUTION INTRAMUSCULAR; INTRAVENOUS; SUBCUTANEOUS at 08:53

## 2025-01-02 RX ADMIN — PROPOFOL 200 MG: 10 INJECTION, EMULSION INTRAVENOUS at 14:55

## 2025-01-02 RX ADMIN — PHENYLEPHRINE HYDROCHLORIDE 100 MCG: 10 INJECTION INTRAVENOUS at 15:38

## 2025-01-02 RX ADMIN — HYDROMORPHONE HYDROCHLORIDE 0.5 MG: 1 INJECTION, SOLUTION INTRAMUSCULAR; INTRAVENOUS; SUBCUTANEOUS at 14:27

## 2025-01-02 RX ADMIN — HYDROMORPHONE HYDROCHLORIDE 0.5 MG: 1 INJECTION, SOLUTION INTRAMUSCULAR; INTRAVENOUS; SUBCUTANEOUS at 17:21

## 2025-01-02 RX ADMIN — SODIUM CHLORIDE, POTASSIUM CHLORIDE, SODIUM LACTATE AND CALCIUM CHLORIDE: 600; 310; 30; 20 INJECTION, SOLUTION INTRAVENOUS at 13:59

## 2025-01-02 RX ADMIN — ONDANSETRON 4 MG: 2 INJECTION INTRAMUSCULAR; INTRAVENOUS at 15:08

## 2025-01-02 RX ADMIN — FENTANYL CITRATE 50 MCG: 50 INJECTION, SOLUTION INTRAMUSCULAR; INTRAVENOUS at 16:30

## 2025-01-02 RX ADMIN — MIDAZOLAM 2 MG: 1 INJECTION INTRAMUSCULAR; INTRAVENOUS at 14:48

## 2025-01-02 RX ADMIN — HYDROMORPHONE HYDROCHLORIDE 0.5 MG: 1 INJECTION, SOLUTION INTRAMUSCULAR; INTRAVENOUS; SUBCUTANEOUS at 19:47

## 2025-01-02 RX ADMIN — PHENYLEPHRINE HYDROCHLORIDE 200 MCG: 10 INJECTION INTRAVENOUS at 15:59

## 2025-01-02 RX ADMIN — DEXMEDETOMIDINE HYDROCHLORIDE 12 MCG: 200 INJECTION INTRAVENOUS at 15:30

## 2025-01-02 RX ADMIN — SODIUM CHLORIDE: 9 INJECTION, SOLUTION INTRAVENOUS at 11:21

## 2025-01-02 RX ADMIN — FENTANYL CITRATE 100 MCG: 50 INJECTION INTRAMUSCULAR; INTRAVENOUS at 14:55

## 2025-01-02 RX ADMIN — OXYCODONE HYDROCHLORIDE 5 MG: 5 TABLET ORAL at 04:13

## 2025-01-02 RX ADMIN — METRONIDAZOLE 500 MG: 500 INJECTION, SOLUTION INTRAVENOUS at 11:21

## 2025-01-02 RX ADMIN — ACETAMINOPHEN 650 MG: 325 TABLET, FILM COATED ORAL at 04:14

## 2025-01-02 RX ADMIN — ERTAPENEM SODIUM 1 G: 1 INJECTION, POWDER, LYOPHILIZED, FOR SOLUTION INTRAMUSCULAR; INTRAVENOUS at 21:11

## 2025-01-02 RX ADMIN — LIDOCAINE HYDROCHLORIDE 100 MG: 20 INJECTION, SOLUTION INFILTRATION; PERINEURAL at 14:55

## 2025-01-02 RX ADMIN — PHENYLEPHRINE HYDROCHLORIDE 200 MCG: 10 INJECTION INTRAVENOUS at 15:40

## 2025-01-02 RX ADMIN — SODIUM CHLORIDE: 9 INJECTION, SOLUTION INTRAVENOUS at 21:59

## 2025-01-02 RX ADMIN — ACETAMINOPHEN 1000 MG: 500 TABLET, FILM COATED ORAL at 20:59

## 2025-01-02 RX ADMIN — ACETAMINOPHEN 650 MG: 325 TABLET, FILM COATED ORAL at 08:53

## 2025-01-02 RX ADMIN — Medication 10 MG: at 00:59

## 2025-01-02 RX ADMIN — SENNOSIDES AND DOCUSATE SODIUM 1 TABLET: 50; 8.6 TABLET ORAL at 11:35

## 2025-01-02 RX ADMIN — DEXAMETHASONE SODIUM PHOSPHATE 4 MG: 4 INJECTION, SOLUTION INTRA-ARTICULAR; INTRALESIONAL; INTRAMUSCULAR; INTRAVENOUS; SOFT TISSUE at 15:28

## 2025-01-02 RX ADMIN — Medication 2 G: at 14:48

## 2025-01-02 RX ADMIN — OXYCODONE HYDROCHLORIDE 5 MG: 5 TABLET ORAL at 00:17

## 2025-01-02 RX ADMIN — ACETAMINOPHEN 650 MG: 325 TABLET, FILM COATED ORAL at 00:17

## 2025-01-02 ASSESSMENT — ACTIVITIES OF DAILY LIVING (ADL)
ADLS_ACUITY_SCORE: 45
ADLS_ACUITY_SCORE: 49
ADLS_ACUITY_SCORE: 45
ADLS_ACUITY_SCORE: 53
ADLS_ACUITY_SCORE: 45
ADLS_ACUITY_SCORE: 49
ADLS_ACUITY_SCORE: 45

## 2025-01-02 ASSESSMENT — LIFESTYLE VARIABLES: TOBACCO_USE: 1

## 2025-01-02 NOTE — PROGRESS NOTES
Cross Coverage: Paged by RN because the patient is requesting melatonin for sleep; he states he takes 10 mg melatonin at home; Melatonin 10 mg p.o. nightly as needed ordered.    Bhavana Treviño, Hospitalist

## 2025-01-02 NOTE — H&P
[facility]    History and Physical - Hospitalist Service       Date of Admission:  1/1/2025    Assessment & Plan      Benny Fofana is a 29 year old male admitted on 1/1/2025. He presents with left nondominant hand flexor tenosynovitis.    Left hand cat bite  Flexor tenosynovitis of the second and third digits of the left hand  Patient is right-hand dominant and was bit on his left hand on 12/30/2024.  He has had progressive erythema and swelling since that point.  At time of presentation he is unable to passively or actively flex his left second and third digit.  His first and second finger are sausage fingers with tenderness of the volar aspect and numbness heading up his wrist.  ED team spoke with hand surgery who recommended admission for washout on 1/2.  - Inpatient  - Levofloxacin and metronidazole given penicillin allergy  - N.p.o. for washout on 1/2/25  - Consult to orthopedic hand surgery  - Consider infectious disease consult pending surgical cultures  -Pain control with as needed Tylenol, as needed ibuprofen, and as needed oxycodone and Dilaudid IV    Patient is medically optimized for OR on 1/2/2025.  No need for further workup.    Hypertension  -Hold PTA telmisartan and preoperative period.  Resume postop pending blood pressure  - As needed labetalol and hydralazine for hypertension            Diet: NPO per Anesthesia Guidelines for Procedure/Surgery Except for: Meds  DVT Prophylaxis: Pneumatic Compression Devices  Jones Catheter: Not present  Lines: None     Cardiac Monitoring: None  Code Status: Full Code    Clinically Significant Risk Factors Present on Admission                 # Drug Induced Platelet Defect: home medication list includes an antiplatelet medication   # Hypertension: Home medication list includes antihypertensive(s)                      Disposition Plan     Medically Ready for Discharge: Anticipated in 2-4 Days           Layo Correa  MD  Hospitalist Service  [facility]  Securely message with [employer] (more info)  Text page via [employer] Paging/Directory     ______________________________________________________________________    Chief Complaint   Left hand pain, erythema, and swelling    History is obtained from the patient, electronic health record, emergency department physician, and patient's significant other    History of Present Illness   Benny Fofana is a 29 year old male who has a history of hypertension and presents to the emergency department after a cat bite and claw injury.  He reports that on 12/30 his cat was trying to attack his dog so he grabbed the cat to get it away.  The cat then proceeded to bite the dorsal aspect of his left hand and clawed him in the webbing between his second and third digits.  Since that time he has had progressive right hand erythema, swelling, and pain.  Time of presentation he is unable to passively or actively range his second and third digit, has numbness spreading up his wrist and is quite tender to touch.    He denies any systemic symptoms such as fevers, chills, rigors, nausea, vomiting, headache, weakness, poor appetite, or other symptoms.    Past Medical History    Past Medical History:   Diagnosis Date    Hypertension        Past Surgical History   No past surgical history on file.    Prior to Admission Medications   Prior to Admission Medications   Prescriptions Last Dose Informant Patient Reported? Taking?   aspirin 81 MG EC tablet Past Month  Yes Yes   Sig: Take 1 tablet by mouth daily   telmisartan (MICARDIS) 80 MG tablet 1/1/2025 Morning  Yes Yes   Sig: Take 80 mg by mouth daily      Facility-Administered Medications: None           Physical Exam   Vital Signs: Temp: 98  F (36.7  C)   BP: 137/87     Resp: 18 SpO2: 97 %      Weight: 0 lbs 0 oz    Constitutional: Awake, alert, cooperative, no apparent distress  Respiratory: Clear to auscultation bilaterally, no  crackles or wheezing  Cardiovascular: Regular rate and rhythm, normal S1 and S2, and no murmur noted  GI: Normal bowel sounds, soft, non-distended, non-tender  Skin/Integumen: Erythema of left hand.  Inability to actively or passively range second and third digit of left hand.  There are several distinct small puncture wounds of the dorsal aspect and one scabbed wound in the webbing between the second and third digit.  Other:       Medical Decision Making       60 MINUTES SPENT BY ME on the date of service doing chart review, history, exam, documentation & further activities per the note.      Data   ------------------------- PAST 24 HR DATA REVIEWED -----------------------------------------------        Imaging results reviewed over the past 24 hrs:   No results found for this or any previous visit (from the past 24 hours).

## 2025-01-02 NOTE — CONSULTS
[facility]    Infectious Disease Consultation     Date of Admission:  1/1/2025  Date of Consult (When I saw the patient): 01/02/25    Assessment & Plan   Benny Fofana is a 29 year old male who was admitted on 1/1/2025.     Impression:  30 yo presented with cat bite to the left index finger near the MCP joint on 12/30/24 with subsequent pain, swelling, and erythema.   Amoxicillin listed as allergy, allergy being hives   On levaquin and flagyl   Seen by ortho plan is I and D   I spoke with patient's mother who is a nurse who remembers hives no anaphylaxis history  Cat is up-to-date with vaccination per patient    Recommendations   Stop Levaquin and Flagyl  Start on IV ertapenem, keep a close eye for any reaction  Spoke with orthopedics concern for tenosynovitis in the OR  Will need IV antibiotics for discharge      Marshall Rodgers MD    Reason for Consult   Reason for consult: I was asked to evaluate this patient for cat bite infection.    Primary Care Physician   Physician No Ref-Primary    Chief Complaint   Cat bite     History is obtained from the patient and medical records    History of Present Illness   Benny Fofana is a 29 year old male who presents with cat bite leading up to a swollen finger     Past Medical History   I have reviewed this patient's medical history and updated it with pertinent information if needed.   Past Medical History:   Diagnosis Date    Hypertension        Past Surgical History   I have reviewed this patient's surgical history and updated it with pertinent information if needed.  No past surgical history on file.    Prior to Admission Medications   Prior to Admission Medications   Prescriptions Last Dose Informant Patient Reported? Taking?   aspirin 81 MG EC tablet Past Month  Yes Yes   Sig: Take 1 tablet by mouth daily   telmisartan (MICARDIS) 80 MG tablet 1/1/2025 Morning  Yes Yes   Sig: Take 80 mg by mouth daily      Facility-Administered  "Medications: None     Allergies   Allergies   Allergen Reactions    Amoxicillin Hives       Immunization History   There is no immunization history for the selected administration types on file for this patient.    Social History   I have reviewed this patient's social history and updated it with pertinent information if needed. Benny Taylorx      Family History   I have reviewed this patient's family history and updated it with pertinent information if needed.   No family history on file.    Review of Systems   The 10 point Review of Systems is negative    Physical Exam   Temp: 97.7  F (36.5  C) Temp src: Oral BP: 120/65 Pulse: 73   Resp: 18 SpO2: 96 % O2 Device: None (Room air)    Vital Signs with Ranges  Temp:  [97.6  F (36.4  C)-98.2  F (36.8  C)] 97.7  F (36.5  C)  Pulse:  [] 73  Resp:  [18] 18  BP: (120-152)/(65-87) 120/65  SpO2:  [96 %-98 %] 96 %  0 lbs 0 oz  There is no height or weight on file to calculate BMI.    GENERAL APPEARANCE:  awake  EYES: Eyes grossly normal to inspection  NECK: no adenopathy  RESP: lungs clear   CV: regular rates and rhythm  LYMPHATICS: normal ant/post cervical and supraclavicular nodes  ABDOMEN: soft, nontender  MS: Swollen left hand  SKIN: no suspicious lesions or rashes        Data   All laboratory and imaging data in the past 24 hours reviewed  No results for input(s): \"CULT\" in the last 168 hours.  No lab results found.    Invalid input(s): \"UC\"       All cultures:  Recent Labs   Lab 01/01/25 2048   CULTURE No growth after 12 hours  No growth after 12 hours      Blood culture:  Results for orders placed or performed during the hospital encounter of 01/01/25   Blood Culture Peripheral Blood    Collection Time: 01/01/25  8:48 PM    Specimen: Peripheral Blood   Result Value Ref Range    Culture No growth after 12 hours    Blood Culture Peripheral Blood    Collection Time: 01/01/25  8:48 PM    Specimen: Peripheral Blood   Result Value Ref Range    Culture No growth " after 12 hours       Urine culture:  No results found for this or any previous visit.

## 2025-01-02 NOTE — PLAN OF CARE
Date & Time: 0000-0700  Summary: ED admit with cellulitis of left hand from a cat bite on 10/30  Behavior & Aggression: Green   Fall Risk: No   Orientation: A&Ox4  ABNL VS/O2:VSS on RA except tachy   Pain Management:PRN oxy and tylenol   Bowel/Bladder: Continent   Drains: PIV SL   Wounds/incisions: L hand redness, swelling, and scabs   Diet:NPO   Activity Level: IND   Tests/Procedures: Plan for washout today   Anticipated  DC Date: Pending

## 2025-01-02 NOTE — ANESTHESIA CARE TRANSFER NOTE
Patient: Benny Fofana    Procedure: Procedure(s):  LEFT INDEX FINGER FLEXOR TENDON SHEATH IRRIGATION AND DORSAL HAND IRRIGATION AND DEBRIDEMENT       Diagnosis: Tenosynovitis [M65.90]  Diagnosis Additional Information: No value filed.    Anesthesia Type:   General     Note:    Oropharynx: oropharynx clear of all foreign objects and spontaneously breathing  Level of Consciousness: awake  Oxygen Supplementation: room air    Independent Airway: airway patency satisfactory and stable  Dentition: dentition unchanged  Vital Signs Stable: post-procedure vital signs reviewed and stable  Report to RN Given: handoff report given  Patient transferred to: PACU  Comments: At end of procedure, spontaneous respirations, adequate tidal volumes, followed commands to voice, LMA removed atraumatically, oropharynx suctioned, airway patent after LMA removal. Oxygen via room air at room air to PACU. SpO2, NiBP, and EKG monitors and alarms on and functioning, report on patient's clinical status given to PACU RN, RN questions answered.      Handoff Report: Identifed the Patient, Identified the Reponsible Provider, Reviewed the pertinent medical history, Discussed the surgical course, Reviewed Intra-OP anesthesia mangement and issues during anesthesia, Set expectations for post-procedure period and Allowed opportunity for questions and acknowledgement of understanding      Vitals:  Vitals Value Taken Time   /21 01/02/25 1623   Temp     Pulse 95 01/02/25 1626   Resp 9 01/02/25 1626   SpO2 96 % 01/02/25 1626   Vitals shown include unfiled device data.    Electronically Signed By: CONRAD Del Rosario CRNA  January 2, 2025  4:27 PM

## 2025-01-02 NOTE — ANESTHESIA POSTPROCEDURE EVALUATION
Patient: Benny Ffoana    Procedure: Procedure(s):  LEFT INDEX FINGER FLEXOR TENDON SHEATH IRRIGATION AND DORSAL HAND IRRIGATION AND DEBRIDEMENT       Anesthesia Type:  General    Note:  Disposition: Inpatient   Postop Pain Control: Uneventful            Sign Out: Well controlled pain   PONV: No   Neuro/Psych: Uneventful            Sign Out: Acceptable/Baseline neuro status   Airway/Respiratory: Uneventful            Sign Out: Acceptable/Baseline resp. status   CV/Hemodynamics: Uneventful            Sign Out: Acceptable CV status; No obvious hypovolemia; No obvious fluid overload   Other NRE: NONE   DID A NON-ROUTINE EVENT OCCUR? No           Last vitals:  Vitals Value Taken Time   /69 01/02/25 1645   Temp 36.6  C (97.8  F) 01/02/25 1623   Pulse 85 01/02/25 1653   Resp 10 01/02/25 1653   SpO2 96 % 01/02/25 1653   Vitals shown include unfiled device data.    Electronically Signed By: Joselin Valadez MD  January 2, 2025  4:54 PM

## 2025-01-02 NOTE — CONSULTS
[facility]    Orthopedic Consultation    Benny Fofana MRN# [id number]   Age: 29 year old YOB: 1995     Date of Admission: 1/1/2025    Reason for consult: Left hand infection s/p cat bite       Requesting physician: Layo Correa MD       Level of consult: Consult, follow and place orders           Assessment and Plan:   Assessment:   Left hand cellulitis with index finger flexor tenosynovitis s/p cat bite      Plan:   The patient's history and clinical/diagnostic findings were reviewed with the on-call orthopedic hand surgeon, Dr. Ryanne Ruiz. The patient sustained a cat bite to the left index finger near the MCP joint on 12/30/24 with subsequent pain, swelling, and erythema. Reports subjective fever as well as nausea. ESR 9, .46, WBC 16.8. Blood cultures pending. Afebrile, VSS. On exam, the patient has punctures to the 2nd/3rd interdigital space and dorsal 2nd MCP joint, fusiform index finger swelling, erythema along the dorsal and palmar hand, inability to fully extend the index finger and flex the digit due to pain. NVI. Reviewed that findings are highly suspicious for flexor tenosynovitis of the left index finger, at minimum, and that surgical intervention is recommended for source control and to prevent worsening/systemic infection. Patient was also informed that he may need extended IV antibiotics after discharge. He voices understanding of this plan. Will plan for a left index finger flexor tendon/hand I&D later today (1/2/25) pending OR availability. Dr. Ruiz will further discuss the risks, benefits, and outcomes of surgery while obtaining consent.     -Remain NPO until postop, but okay to have a few ice chips this morning.  -Continue with IV antibiotics per medicine team. ID consult ordered as we anticipate need for extended antibiotics postoperatively and the patient is allergic to amoxicillin.   -Check MRSA nasal PCR, ESR, and CRP. Trend CRP,  "WBC, and vitals.  -NWB LUE.   -Continue pain regimen. Encourage elevation.  -Hold any PTA anticoagulation (none per patient).     Please contact orthopedic trauma team if any questions or concerns arise.           Chief Complaint:   Left hand infection s/p cat bite         History of Present Illness:   Medical history obtained via chart review and discussion with the patient and his fiance Heather, at bedside. Benny Fofana is a 29 year old right-hand dominant male with past medical history of HTN who was admitted on 1/1/25 with concerns for left index finger flexor tenosynovitis after a cat bite. On 12/30/24, the patient's cat, who is up to date on its vaccines including the rabies vaccine, bit his left hand. This resulted in a larger puncture in the 2nd/3rd interdigital space at the ulnar slightly volar base of the index finger and few superficial abrasions vs shallow punctures dorsally to the second MCP joint. He had progressive swelling, redness, and pain mostly affecting the left index finger but also with extension into the majority of the hand. Endorses numbness to the left index finger. Patient notes feeling hot and nauseous. He presented [facility] ED on 1/1/25 at which time he was started on levofloxacin and metronidazole due to his penicillin allergy (hives with amoxicillin). This morning, he notes no significant improvement in his symptoms aside from questionably slightly \"less red\" discoloration to the dorsal hand. Left index finger pain is worse today. He seems to be tolerating his antibiotics well. No prior injuries or surgeries to his left hand. Patient is active and independent. No known history of MRSA or prior skin/soft tissue/joint infections. No PTA anticoagulants. NPO since at least midnight.          Past Medical History:     Past Medical History:   Diagnosis Date    Hypertension              Past Surgical History:   No past surgical history on file.          Social History:     Social History "     Tobacco Use    Smoking status: Not on file    Smokeless tobacco: Not on file   Substance Use Topics    Alcohol use: Not on file             Family History:   No family history on file.          Immunizations:     VACCINE/DOSE   Diptheria   DPT   DTAP   HBIG   Hepatitis A   Hepatitis B   HIB   Influenza   Measles   Meningococcal   MMR   Mumps   Pneumococcal   Polio   Rubella   Small Pox   TDAP   Varicella   Zoster             Allergies:     Allergies   Allergen Reactions    Amoxicillin Hives             Medications:     Current Facility-Administered Medications   Medication Dose Route Frequency Provider Last Rate Last Admin    acetaminophen (TYLENOL) tablet 650 mg  650 mg Oral Q4H PRN Layo Correa MD   650 mg at 01/02/25 0414    Or    acetaminophen (TYLENOL) Suppository 650 mg  650 mg Rectal Q4H PRN Layo Correa MD        calcium carbonate (TUMS) chewable tablet 1,000 mg  1,000 mg Oral 4x Daily PRN Layo Correa MD        hydrALAZINE (APRESOLINE) injection 10 mg  10 mg Intravenous Q4H PRN Layo Correa MD        HYDROmorphone (DILAUDID) injection 0.2 mg  0.2 mg Intravenous Q2H PRN Layo Correa MD        HYDROmorphone (DILAUDID) injection 0.4 mg  0.4 mg Intravenous Q2H PRN Layo Correa MD        ibuprofen (ADVIL/MOTRIN) tablet 600 mg  600 mg Oral Q6H PRN Layo Correa MD        labetalol (NORMODYNE/TRANDATE) injection 10 mg  10 mg Intravenous Q2H PRN Layo Correa MD        levofloxacin (LEVAQUIN) tablet 500 mg  500 mg Oral Q24H Layo Correa MD        lidocaine (LMX4) cream   Topical Q1H PRN Layo Correa MD        lidocaine 1 % 0.1-1 mL  0.1-1 mL Other Q1H PRN Layo Correa MD        melatonin tablet 10 mg  10 mg Oral At Bedtime PRN Valentina Treviño MD   10 mg at 01/02/25 0059    metroNIDAZOLE (FLAGYL) infusion 500 mg  500 mg Intravenous Q12H Layo Correa MD        naloxone (NARCAN) injection 0.2  mg  0.2 mg Intravenous Q2 Min PRN José Luis Sanders MD        Or    naloxone (NARCAN) injection 0.4 mg  0.4 mg Intravenous Q2 Min PRN José Luis Sanders MD        Or    naloxone (NARCAN) injection 0.2 mg  0.2 mg Intramuscular Q2 Min PRN José Luis Sanders MD        Or    naloxone (NARCAN) injection 0.4 mg  0.4 mg Intramuscular Q2 Min PRN José Luis Sanders MD        ondansetron (ZOFRAN ODT) ODT tab 4 mg  4 mg Oral Q6H PRN Layo Correa MD        Or    ondansetron (ZOFRAN) injection 4 mg  4 mg Intravenous Q6H PRN Layo Correa MD        oxyCODONE (ROXICODONE) tablet 5 mg  5 mg Oral Q4H PRN Layo Correa MD   5 mg at 01/02/25 0413    oxyCODONE IR (ROXICODONE) half-tab 2.5 mg  2.5 mg Oral Q4H PRN Layo Correa MD        polyethylene glycol (MIRALAX) Packet 17 g  17 g Oral BID PRN Layo Correa MD        prochlorperazine (COMPAZINE) injection 10 mg  10 mg Intravenous Q6H PRN Layo Correa MD        Or    prochlorperazine (COMPAZINE) tablet 10 mg  10 mg Oral Q6H PRN Layo Correa MD        senna-docusate (SENOKOT-S/PERICOLACE) 8.6-50 MG per tablet 1 tablet  1 tablet Oral BID PRN Layo Correa MD        Or    senna-docusate (SENOKOT-S/PERICOLACE) 8.6-50 MG per tablet 2 tablet  2 tablet Oral BID PRLayo Mccartney MD        sodium chloride (PF) 0.9% PF flush 3 mL  3 mL Intracatheter Q8H Layo Correa MD   3 mL at 01/01/25 2049    sodium chloride (PF) 0.9% PF flush 3 mL  3 mL Intracatheter q1 min prn Layo Correa MD        sodium chloride 0.9 % infusion   Intravenous Continuous Jeanette Diggs PA-C                 Review of Systems:   CV: NEGATIVE for chest pain, palpitations or peripheral edema  C: NEGATIVE for fever, chills, change in weight  E/M: NEGATIVE for ear, mouth and throat problems  R: NEGATIVE for significant cough or SOB          Physical Exam:   All vitals have been  reviewed  Patient Vitals for the past 24 hrs:   BP Temp Temp src Pulse Resp SpO2   01/02/25 0747 120/65 97.7  F (36.5  C) Oral 73 18 96 %   01/02/25 0408 121/70 97.6  F (36.4  C) Oral 115 18 96 %   01/02/25 0009 (!) 152/86 98.2  F (36.8  C) Oral 120 18 98 %   01/01/25 2345 136/87 -- -- 110 -- 98 %   01/01/25 2000 -- -- -- 100 -- --   01/01/25 1846 137/87 98  F (36.7  C) -- -- 18 97 %     No intake or output data in the 24 hours ending 01/02/25 0833    Constitutional: Pleasant, alert, appropriate, following commands. NAD. Non-toxic appearing.  HEENT: Head atraumatic normocephalic. Pupils equal round and reactive.  Respiratory: Unlabored breathing no audible wheeze  Cardiovascular: Regular rate and rhythm per pulses.  GI: Abdomen is non-distended.  Lymph/Hematologic: No lymphadenopathy in areas examined.  Genitourinary: No fink  Skin: No rashes, no cyanosis.  Musculoskeletal: Left upper extremity: Larger puncture in the 2nd/3rd interdigital space at the ulnar, slightly volar, base of the index finger and few superficial abrasions vs shallow punctures dorsally to the second MCP joint. Notably, the right upper extremity has various healing abrasions as well. Erythema throughout the left index finger with extension to the dorsal and volar central hand. No ecchymosis. No active or expressible drainage from the punctures. Fusiform moderate edema of the left index finger with mild edema throughout the entirety of the hand. No palpable wrist effusion. Forearm is soft and compressible. Diffusely tender to the left hand but worse over the left index finger puncture sites, MCP joint, and flexor tendon sheath. Nontender to the radiocarpal joint, DRUJ, and TFCC. Patient is able to range the left wrist without increased pain. He is unable to flex the left index finger due to pain and only has a flicker of extension at the second MCP joint due to pain and edema. Limited ROM of all other digits due to edema, but able to actively  flex and extend the thumb IP joint. Radial pulse 2+. Brisk capillary refill. SILT A/M/R/U nerve distributions.  Neurologic: GCS 15, A&OX4, normal mood and affect          Data:   All laboratory data reviewed  Results for orders placed or performed during the hospital encounter of 01/01/25   Basic metabolic panel     Status: Abnormal   Result Value Ref Range    Sodium 135 135 - 145 mmol/L    Potassium 4.2 3.4 - 5.3 mmol/L    Chloride 96 (L) 98 - 107 mmol/L    Carbon Dioxide (CO2) 24 22 - 29 mmol/L    Anion Gap 15 7 - 15 mmol/L    Urea Nitrogen 14.2 6.0 - 20.0 mg/dL    Creatinine 1.02 0.67 - 1.17 mg/dL    GFR Estimate >90 >60 mL/min/1.73m2    Calcium 9.5 8.8 - 10.4 mg/dL    Glucose 83 70 - 99 mg/dL   CBC with platelets and differential     Status: Abnormal   Result Value Ref Range    WBC Count 18.7 (H) 4.0 - 11.0 10e3/uL    RBC Count 5.63 4.40 - 5.90 10e6/uL    Hemoglobin 17.2 13.3 - 17.7 g/dL    Hematocrit 49.4 40.0 - 53.0 %    MCV 88 78 - 100 fL    MCH 30.6 26.5 - 33.0 pg    MCHC 34.8 31.5 - 36.5 g/dL    RDW 14.2 10.0 - 15.0 %    Platelet Count 368 150 - 450 10e3/uL    % Neutrophils 75 %    % Lymphocytes 15 %    % Monocytes 8 %    % Eosinophils 1 %    % Basophils 0 %    % Immature Granulocytes 0 %    NRBCs per 100 WBC 0 <1 /100    Absolute Neutrophils 13.9 (H) 1.6 - 8.3 10e3/uL    Absolute Lymphocytes 2.9 0.8 - 5.3 10e3/uL    Absolute Monocytes 1.6 (H) 0.0 - 1.3 10e3/uL    Absolute Eosinophils 0.2 0.0 - 0.7 10e3/uL    Absolute Basophils 0.1 0.0 - 0.2 10e3/uL    Absolute Immature Granulocytes 0.1 <=0.4 10e3/uL    Absolute NRBCs 0.0 10e3/uL   iStat Gases (lactate) venous, POCT     Status: Abnormal   Result Value Ref Range    Lactic Acid POCT 0.8 <=2.0 mmol/L    Bicarbonate Venous POCT 26 21 - 28 mmol/L    O2 Sat, Venous POCT 92 (H) 70 - 75 %    pCO2 Venous POCT 42 40 - 50 mm Hg    pH Venous POCT 7.41 7.32 - 7.43    pO2 Venous POCT 63 (H) 25 - 47 mm Hg    Base Excess/Deficit (+/-) POCT 1.0 -3.0 - 3.0 mmol/L   RBC and  Platelet Morphology     Status: Abnormal   Result Value Ref Range    RBC Morphology Confirmed RBC Indices     Platelet Assessment  Automated Count Confirmed. Platelet morphology is normal.     Automated Count Confirmed. Platelet morphology is normal.    Reactive Lymphocytes Present (A) None Seen   Basic metabolic panel     Status: Abnormal   Result Value Ref Range    Sodium 139 135 - 145 mmol/L    Potassium 4.3 3.4 - 5.3 mmol/L    Chloride 99 98 - 107 mmol/L    Carbon Dioxide (CO2) 26 22 - 29 mmol/L    Anion Gap 14 7 - 15 mmol/L    Urea Nitrogen 16.6 6.0 - 20.0 mg/dL    Creatinine 1.23 (H) 0.67 - 1.17 mg/dL    GFR Estimate 82 >60 mL/min/1.73m2    Calcium 9.3 8.8 - 10.4 mg/dL    Glucose 95 70 - 99 mg/dL   CBC with platelets     Status: Abnormal   Result Value Ref Range    WBC Count 16.8 (H) 4.0 - 11.0 10e3/uL    RBC Count 5.57 4.40 - 5.90 10e6/uL    Hemoglobin 16.5 13.3 - 17.7 g/dL    Hematocrit 50.9 40.0 - 53.0 %    MCV 91 78 - 100 fL    MCH 29.6 26.5 - 33.0 pg    MCHC 32.4 31.5 - 36.5 g/dL    RDW 14.2 10.0 - 15.0 %    Platelet Count 368 150 - 450 10e3/uL   CRP inflammation     Status: Abnormal   Result Value Ref Range    CRP Inflammation 144.46 (H) <5.00 mg/L   Erythrocyte sedimentation rate auto     Status: Normal   Result Value Ref Range    Erythrocyte Sedimentation Rate 9 0 - 15 mm/hr   CBC with platelets differential     Status: Abnormal    Narrative    The following orders were created for panel order CBC with platelets differential.  Procedure                               Abnormality         Status                     ---------                               -----------         ------                     CBC with platelets and d...[[id number]]  Abnormal            Final result               RBC and Platelet Morphology[[id number]]  Abnormal            Final result                 Please view results for these tests on the individual orders.          Attestation:  I have reviewed today's vital signs, notes,  medications, labs and imaging with Dr. Ryanne Ruiz.  Amount of time performed on this consult: 50 minutes.    Adrienne Davis PA-C  [facility]

## 2025-01-02 NOTE — PROGRESS NOTES
RECEIVING UNIT ED HANDOFF REVIEW    ED Nurse Handoff Report was reviewed by: Leni Olivas RN on January 1, 2025 at 11:52 PM

## 2025-01-02 NOTE — ED TRIAGE NOTES
Pt bit by his cat on Monday, has multiple puncture marks on left hand, swollen and red     Triage Assessment (Adult)       Row Name 01/01/25 7996          Triage Assessment    Airway WDL WDL        Respiratory WDL    Respiratory WDL WDL        Skin Circulation/Temperature WDL    Skin Circulation/Temperature WDL --  Red and swollen left hand        Cardiac WDL    Cardiac WDL WDL        Peripheral/Neurovascular WDL    Peripheral Neurovascular WDL WDL        Cognitive/Neuro/Behavioral WDL    Cognitive/Neuro/Behavioral WDL WDL

## 2025-01-02 NOTE — ED PROVIDER NOTES
Emergency Department Note      History of Present Illness     Chief Complaint   Cat Bite    HPI   Benny Fofana is an other wise healthy, right-handed 29 year old male with a history of hypertension who presents to the ED for a cat bite. On Monday, the patient's cat bit and scratched his left hand on/between his second and third fingers. No other injuries associated with the incident. This morning, his hand became red and swollen.  He reports his left index finger in particular is swollen and he has difficulty flexing it.  He reports slight tingling to the left index finger.  He also notes tingling to the left wrist.  Patient is 100% sure that Does not have any missing teeth or partially missing teeth.  He has an allergy to Amoxicillin. He develops hives when he takes it.     Independent Historian   None    Review of External Notes   [facility] shows last td was in 2015.  Reviewed this with the patient.  He reports that he was incarcerated 6 years ago and is 100% sure that he received a tetanus shot at that time.    Past Medical History   Medical History and Problem List   Anabolic steroid use  Caffeine abuse  Hypertension  Major depressive disorder    Medications   Acetylcysteine  Fluoxetine  Telmisartan  Aspirin 81 mg    Physical Exam   Patient Vitals for the past 24 hrs:   BP Temp Pulse Resp SpO2   01/01/25 2000 -- -- 100 -- --   01/01/25 1846 137/87 98  F (36.7  C) -- 18 97 %     Physical Exam  General:  Sitting on bed, comfortable appearing.   HENT:  No obvious trauma to head  Right Ear:  External ear normal.   Left Ear:  External ear normal.   Nose:  Nose normal.   Eyes:  Conjunctivae and EOM are normal.  Neck: Normal range of motion. Neck supple. No tracheal deviation present.   Pulm/Chest: No respiratory distress  M/S: Normal range of motion.  Patient with erythema and warmth to the dorsal left hand as noted in the picture below.  Additionally the patient does have tenderness to the flexor side of the  left index finger.  He has some pain over the index finger flexor tendon overlying the palm.  Cap refills less than 2 seconds to the left index finger.  Neuro: Alert. GCS 15.  Skin: Skin is warm and dry. No rash noted. Not diaphoretic.   Psych: Normal mood and affect. Behavior is normal.                 Diagnostics   Lab Results   Labs Ordered and Resulted from Time of ED Arrival to Time of ED Departure   ISTAT GASES LACTATE VENOUS POCT - Abnormal       Result Value    Lactic Acid POCT 0.8      Bicarbonate Venous POCT 26      O2 Sat, Venous POCT 92 (*)     pCO2 Venous POCT 42      pH Venous POCT 7.41      pO2 Venous POCT 63 (*)     Base Excess/Deficit (+/-) POCT 1.0     BASIC METABOLIC PANEL   CBC WITH PLATELETS AND DIFFERENTIAL   BLOOD CULTURE   BLOOD CULTURE       Imaging   No orders to display     Independent Interpretation   None    ED Course    Medications Administered   Medications   levofloxacin (LEVAQUIN) infusion 750 mg (750 mg Intravenous $New Bag 1/1/25 2049)   metroNIDAZOLE (FLAGYL) infusion 500 mg (500 mg Intravenous $New Bag 1/1/25 2048)   lidocaine 1 % 0.1-1 mL (has no administration in time range)   lidocaine (LMX4) cream (has no administration in time range)   sodium chloride (PF) 0.9% PF flush 3 mL (3 mLs Intracatheter $Given 1/1/25 2049)   sodium chloride (PF) 0.9% PF flush 3 mL (has no administration in time range)   senna-docusate (SENOKOT-S/PERICOLACE) 8.6-50 MG per tablet 1 tablet (has no administration in time range)     Or   senna-docusate (SENOKOT-S/PERICOLACE) 8.6-50 MG per tablet 2 tablet (has no administration in time range)   calcium carbonate (TUMS) chewable tablet 1,000 mg (has no administration in time range)   levofloxacin (LEVAQUIN) infusion 500 mg (has no administration in time range)   metroNIDAZOLE (FLAGYL) infusion 500 mg (has no administration in time range)   acetaminophen (TYLENOL) tablet 650 mg (has no administration in time range)     Or   acetaminophen (TYLENOL)  Suppository 650 mg (has no administration in time range)   ibuprofen (ADVIL/MOTRIN) tablet 600 mg (has no administration in time range)   oxyCODONE IR (ROXICODONE) half-tab 2.5 mg (has no administration in time range)   oxyCODONE (ROXICODONE) tablet 5 mg (has no administration in time range)   HYDROmorphone (DILAUDID) injection 0.2 mg (has no administration in time range)   HYDROmorphone (DILAUDID) injection 0.4 mg (has no administration in time range)   polyethylene glycol (MIRALAX) Packet 17 g (has no administration in time range)   prochlorperazine (COMPAZINE) injection 10 mg (has no administration in time range)     Or   prochlorperazine (COMPAZINE) tablet 10 mg (has no administration in time range)   ondansetron (ZOFRAN ODT) ODT tab 4 mg (has no administration in time range)     Or   ondansetron (ZOFRAN) injection 4 mg (has no administration in time range)   labetalol (NORMODYNE/TRANDATE) injection 10 mg (has no administration in time range)   hydrALAZINE (APRESOLINE) injection 10 mg (has no administration in time range)   sodium chloride 0.9% BOLUS 1,000 mL (has no administration in time range)   Tdap (tetanus-diphtheria-acell pertussis) (ADACEL) injection 0.5 mL (0.5 mLs Intramuscular Not Given 1/1/25 2020)       Procedures   Procedures     Discussion of Management   Clinical pharmacist, as noted.     ED Course   ED Course as of 01/01/25 2103 Wed Jan 01, 2025 1941 I obtained history and examined the patient as noted above.     1952 I spoke with the clinical pharmacist regarding the patient. We discussed management of cellulitis from a cat bite and Amoxicillin allergy.    2010 I spoke with Dr. Ryanne Ruiz, hand orthopedics, regarding the patient. We discussed IV antibiotics and admission to the hospital.     2015 Updated patient on plan of care and he agrees.   2020 I spoke to the hospitalist, , who has agreed to admit the patient for continued evaluation and treatment.       Additional  Documentation  None    Medical Decision Making / Diagnosis   CMS Diagnoses: None    MIPS       None    [facility]   Benny Fofana is a very pleasant 29 year old male who presents after being bit by his cat 3 days ago.  He developed some erythema yesterday.  Today his left index finger has become more swollen.  The patient has decreased range of motion to the index finger.  He reports some slight tingling to the index finger.  He has pain over the flexor tendon overlying the palm of this finger.  I have concern for flexor tenosynovitis.  I consulted the hand surgeon who agreed with IV antibiotics and surgical washout.  The patient reports he had a piece of pizza just prior to coming into the ED.  He will be kept n.p.o. for surgical washout in the early morning hours.  I consulted our clinical pharmacist given the patient's amoxicillin allergy.  Levaquin and Flagyl IV were recommended.  Basic labs and blood cultures were obtained.  These are still pending.  Care of the patient is signed out to my ED colleague,  who agrees to follow-up if these labs are significantly abnormal.  [facility] reviewed and showed patient's last tetanus was in 2015, but he reports being incarcerated 6 years ago and is confident he received a tetanus at that time.  Patient reports all the cat teeth are intact and he has no concern for retained tooth in the wound.  He already washed the wound at home as well. I spoke to the hospitalist as above who has agreed to admit the patient for continued evaluation and treatment.    Disposition   The patient was discharged.     Diagnosis     ICD-10-CM    1. Flexor tenosynovitis of finger  M65.949       2. Cellulitis of left hand  L03.114       3. Cat bite, initial encounter  W55.01XA            Discharge Medications   New Prescriptions    No medications on file     Scribe Disclosure:  Morgan MARTINES, am serving as a scribe at 7:47 PM on 1/1/2025 to document services personally performed by  Jamel Castillo,  based on my observations and the provider's statements to me.        Jamel Castillo DO  01/01/25 2108

## 2025-01-02 NOTE — ED NOTES
[facility]  ED Nurse Handoff Report    ED Chief complaint: Cat Bite      ED Diagnosis:   Final diagnoses:   Flexor tenosynovitis of finger   Cellulitis of left hand   Cat bite, initial encounter       Code Status: Per admitting    Allergies:   Allergies   Allergen Reactions    Amoxicillin Hives       Patient Story:   Pt presents to ED after having his cat bite him on Monday between his second and third fingers. Initially, only concern was redness and swelling. Today, pt Is having difficulty flexing his left index finger. Pt reports tingling and pain. Plan for a surgical wash out and IV antibiotics.     Focused Assessment:    Neuro: Alert, oriented x 4  Respiratory:Clear lung sounds, on room air   Cardiology:  sinus   Gastrointestinal: soft, non tender, non distended   Genitourinary/Renal:    Musculoskeletal: moves all extremities   Skin: Intact skin , redness and swelling to the left index finger  Lines: 18G L forearm    Labs Ordered and Resulted from Time of ED Arrival to Time of ED Departure   ISTAT GASES LACTATE VENOUS POCT - Abnormal       Result Value    Lactic Acid POCT 0.8      Bicarbonate Venous POCT 26      O2 Sat, Venous POCT 92 (*)     pCO2 Venous POCT 42      pH Venous POCT 7.41      pO2 Venous POCT 63 (*)     Base Excess/Deficit (+/-) POCT 1.0     BASIC METABOLIC PANEL   CBC WITH PLATELETS AND DIFFERENTIAL   BLOOD CULTURE   BLOOD CULTURE        No orders to display         Treatments and/or interventions provided:      Medications   levofloxacin (LEVAQUIN) infusion 750 mg (750 mg Intravenous $New Bag 1/1/25 2049)   metroNIDAZOLE (FLAGYL) infusion 500 mg (500 mg Intravenous $New Bag 1/1/25 2048)   lidocaine 1 % 0.1-1 mL (has no administration in time range)   lidocaine (LMX4) cream (has no administration in time range)   sodium chloride (PF) 0.9% PF flush 3 mL (3 mLs Intracatheter $Given 1/1/25 2049)   sodium chloride (PF) 0.9% PF flush 3 mL (has no administration in time range)    senna-docusate (SENOKOT-S/PERICOLACE) 8.6-50 MG per tablet 1 tablet (has no administration in time range)     Or   senna-docusate (SENOKOT-S/PERICOLACE) 8.6-50 MG per tablet 2 tablet (has no administration in time range)   calcium carbonate (TUMS) chewable tablet 1,000 mg (has no administration in time range)   levofloxacin (LEVAQUIN) infusion 500 mg (has no administration in time range)   metroNIDAZOLE (FLAGYL) infusion 500 mg (has no administration in time range)   acetaminophen (TYLENOL) tablet 650 mg (has no administration in time range)     Or   acetaminophen (TYLENOL) Suppository 650 mg (has no administration in time range)   ibuprofen (ADVIL/MOTRIN) tablet 600 mg (has no administration in time range)   oxyCODONE IR (ROXICODONE) half-tab 2.5 mg (has no administration in time range)   oxyCODONE (ROXICODONE) tablet 5 mg (has no administration in time range)   HYDROmorphone (DILAUDID) injection 0.2 mg (has no administration in time range)   HYDROmorphone (DILAUDID) injection 0.4 mg (has no administration in time range)   polyethylene glycol (MIRALAX) Packet 17 g (has no administration in time range)   prochlorperazine (COMPAZINE) injection 10 mg (has no administration in time range)     Or   prochlorperazine (COMPAZINE) tablet 10 mg (has no administration in time range)   ondansetron (ZOFRAN ODT) ODT tab 4 mg (has no administration in time range)     Or   ondansetron (ZOFRAN) injection 4 mg (has no administration in time range)   labetalol (NORMODYNE/TRANDATE) injection 10 mg (has no administration in time range)   hydrALAZINE (APRESOLINE) injection 10 mg (has no administration in time range)   sodium chloride 0.9% BOLUS 1,000 mL (1,000 mLs Intravenous $New Bag 1/1/25 2106)   Tdap (tetanus-diphtheria-acell pertussis) (ADACEL) injection 0.5 mL (0.5 mLs Intramuscular Not Given 1/1/25 2020)        Patient's response to treatments and/or interventions:   Resting comfortably    To be done/followed up on inpatient  unit:    See any in-patient orders    Does this patient have any cognitive concerns?:  none    Activity level - Baseline/Home:    Independent    Activity Level - Current:     Independent    Patient's Preferred language: English     Needed?: No    Isolation: None  Infection: Not Applicable  Patient tested for COVID 19 prior to admission: NO    Bariatric?: No    Vital Signs:   Vitals:    01/01/25 1846 01/01/25 2000   BP: 137/87    Pulse:  100   Resp: 18    Temp: 98  F (36.7  C)    SpO2: 97%        Cardiac Rhythm: NSR    Was the PSS-3 completed:   Yes    Family Comments: family at bedside    For the majority of the shift this patient's behavior was Green.   Behavioral interventions performed were none    ED NURSE PHONE NUMBER: [phone]

## 2025-01-02 NOTE — PLAN OF CARE
Goal Outcome Evaluation:      Plan of Care Reviewed With: patient    Overall Patient Progress: no changeOverall Patient Progress: no change         Shift: 1/2/25, [id number]  POD#/Summary: ED admit with cellulitis of left hand from a cat bite on 12/30     Orientation: A&Ox4  Vital Signs: VSS on RA  Pain Management: IV dilaudid x2   Bowel/Bladder: Continent, no BM, passing gas, up to void  IV: PIV - infusing NS @ 100mL/hr, intermittent antibiotics   Wounds/Incisions: L hand swelling/redness, scabs  Diet: NPO  Activity Level: Independent   Tests/Procedures: Brought down for surgical washout around 1330 this shift  Anticipated Discharge Date/Plan: Pending

## 2025-01-02 NOTE — PROGRESS NOTES
[facility]    Medicine Progress Note - Hospitalist Service    Date of Admission:  1/1/2025    Assessment & Plan   Benny Fofana is a 29 year old male admitted on 1/1/2025 with left nondominant hand flexor tenosynovitis after a cat bite.     Left hand cat bite  Flexor tenosynovitis of the second and third digits of the left hand  Patient is right-hand dominant and was bit on his left hand on 12/30/2024. He has had progressive erythema and swelling since that point.  At time of presentation he is unable to passively or actively flex his left second and third digit.  His first and second finger are sausage fingers with tenderness of the volar aspect and numbness heading up his wrist.  ED team spoke with hand surgery who recommended admission for washout on 1/2.  WBC 18.7-16.8. LA WNL on admission. .   - Inpatient status  - Levofloxacin and metronidazole given penicillin allergy --> changed to Ertapenem by ID, while monitoring closely for any reaction  - Consult to orthopedic hand surgery   -s/p OR for washout 01/02/25, operative report pending  -Continue with IV antibiotics  -ID consult as we anticipate need for extended antibiotics postoperatively and the patient is allergic to amoxicillin.   -Check MRSA nasal PCR, ESR, and CRP. Trend CRP, WBC, and vitals.  -NWB GREG.   -Continue pain regimen. 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  -MIVF  -Avoid Nephrotoxins  -Monitor closely     Hypertension  - Hold PTA telmisartan and preoperative period.  Resume postop pending blood pressure  - As needed labetalol and hydralazine for hypertension        Diet: Advance Diet as Tolerated: Regular Diet Adult    DVT Prophylaxis: Ambulate every shift  Jones Catheter: Not present  Lines: None     Cardiac Monitoring: None  Code Status: Full Code      Clinically Significant Risk Factors Present on Admission          # Hypochloremia: Lowest Cl = 96 mmol/L in last 2 days, will monitor as appropriate        # Drug Induced Platelet Defect: home medication list includes an antiplatelet medication   # Hypertension: Home medication list includes antihypertensive(s)           # Obesity: Estimated body mass index is 30.52 kg/m  as calculated from the following:    Height as of this encounter: 1.829 m (6').    Weight as of this encounter: 102.1 kg (225 lb).              Social Drivers of Health    Tobacco Use: Medium Risk (7/11/2024)    Received from [employer]    Patient History     Smoking Tobacco Use: Former     Smokeless Tobacco Use: Never     Passive Exposure: Past          Disposition Plan     Medically Ready for Discharge: Anticipated in 2-4 Days           The patient's care was discussed with the Attending Physician, Dr. Sanders, Patient, and Charge Nurse .    Jeanette Diggs PA-C  Hospitalist Service  [facility]  Securely message with [employer] (more info)  Text page via [email or web address] Paging/Directory   ______________________________________________________________________    Interval History   Seen and examined. Pain improving with IV Dilaudid, 5mg oxycodone not very effective. 8/10 severe overnight. Continues with index swelling, erythema, and pain. Minimal ability for flexion of 2nd digit. No nausea or vomiting.  Afebrile and hemodynamically stable. Distal pulses palpable. Questions welcomed and answered to the best of my knowledge.    Physical Exam   Vital Signs: Temp: 97.7  F (36.5  C) Temp src: Temporal BP: 121/75 Pulse: 82   Resp: 12 SpO2: 95 % O2 Device: None (Room air)    Weight: 225 lbs 0 oz    Physical Exam    General: Awake, alert, very pleasant man who appears stated age. Looks comfortable sitting up in chair. No acute distress.  HEENT: Normocephalic, atraumatic. Extraocular movements intact.   Respiratory: Clear to auscultation bilaterally, no rales, wheezing, or rhonchi.  Cardiovascular: Regular rate and rhythm, +S1 and S2, no murmur auscultated. No peripheral edema.   Gastrointestinal: Soft, non-tender, non-distended. Bowel sounds present.  Skin: Warm, dry. No obvious rashes or lesions on exposed skin. Dorsalis pedis pulses palpable bilaterally.  Musculoskeletal: Left hand with erythema and extensive swelling and tenderness over dorsum of hand and extending along 2nd digit. Minimal flexion.   Neurologic: AAO x3.   Psychiatric: Appropriate mood and affect. No obvious anxiety or depression.      Medical Decision Making       >35 MINUTES SPENT BY ME on the date of service doing chart review, history, exam, documentation & further activities per the note.      Data     I have personally reviewed the following data over the past 24 hrs:    16.8 (H)  \   16.5   / 368     139 99 16.6 /  95   4.3 26 1.23 (H) \     Procal: N/A CRP: 144.46 (H) Lactic Acid: 0.8         Imaging results reviewed over the past 24 hrs:   Recent Results (from the past 24 hours)   XR Hand Left G/E 3 Views    Narrative    XR HAND LEFT G/E 3 VIEWS 1/2/2025 9:04 AM    HISTORY: Left hand infection s/p cat bite, radiographs for surgical  planning    COMPARISON: None.      Impression    IMPRESSION: No fracture or foreign body. No findings to suggest  osteomyelitis.    NOÉ BABCOCK MD         SYSTEM ID:  [id number]

## 2025-01-02 NOTE — ANESTHESIA PROCEDURE NOTES
Airway    Staff -        Anesthesiologist:  Daniel Queen MD       CRNA: Shey Dickerson APRN CRNA       Performed By: CRNAIndications and Patient Condition       Indications for airway management: elin-procedural       Induction type:intravenous       Mask difficulty assessment: 0 - not attempted    Final Airway Details       Final airway type: supraglottic airway    Supraglottic Airway Details        Type: LMA       Brand: I-Gel       LMA size: 5       Airway Seal Pressure (cm H2O): 20    Post intubation assessment        Placement verified by: capnometry, equal breath sounds and chest rise        Number of attempts at approach: 1       Number of other approaches attempted: 0       Secured with: tape       Ease of procedure: easy       Dentition: Intact and Unchanged

## 2025-01-02 NOTE — OP NOTE
Date of Service: January 2, 2025    Pre-Operative Diagnosis:   Left index finger flexor tenosynovitis.  Left second webspace collar button abscess.  Left hand cat bite.    Post-Operative Diagnosis:  1. Left index finger flexor tenosynovitis.  2. Left second webspace collar button abscess.  3. Left hand cat bite.    Procedure:   Left index finger flexor tendon sheath irrigation.  Left index finger A1 pulley release.  Left second webspace irrigation and excisional debridement to the level of deep soft tissue.  Left dorsal hand incision, irrigation, and excisional debridement to the level of the extensor tendon.    Attending Surgeon: Ryanne Ruiz MD    Assistant: Quiana Arellano PA-C    Anesthesia: General plus local consisting of 5 ml 0.25% Marcaine plain.    Estimated Blood Loss: 5 cc    Tourniquet Time: 37 minutes    Specimens:   Culture swabs were sent from the second webspace for gram stain, aerobic, anaerobic, and fungal cultures.  Culture swabs from the left index finger flexor tendon sheath were sent for gram stain, aerobic, anaerobic, and fungal cultures.    Drains: Three pieces of quarter-inch packing plain were placed into the wounds, two volarly, and one dorsally.    Implants: None.    Complications: None apparent.    Brief History: [11]    Intraoperative Findings: Gross purulence in the flexor tendon sheath at the level of the A1 pulley and over the proximal phalanx.  The wound in the second webspace also demonstrated purulence that tracked through the webspace to the dorsal hand at the index MCP joint.  Cloudy fluid was present dorsally at the dorsal bite wound, but no gross purulence.  The flexor and extensor tendons remained intact.    Description of Procedure: [13]    Post-Operative Plan: The patient will return to the inpatient floor for continued IV antibiotics (IV ertapenem). Infectious disease has been consulted.  He will require a PICC line and IV antibiotics at discharge.  I do not anticipate a  return to the OR unless he does not demonstrate adequate clinical response to today's surgical procedure.      Ryanne Ruiz MD         administered.  The arm was elevated for gravity exsanguination, the tourniquet was inflated to 250 mmHg.  A volar Marcel incision was made along the volar aspect of the left index finger, incorporating the puncture wound at the ulnar aspect of the MP flexion crease.  Full-thickness skin flaps were elevated.  The neurovascular bundles were identified and protected.  There was gross purulence noted along the flexor tendon sheath at the MP flexion crease.  Culture swabs from the puncture wound site were sent for Gram stain, aerobic, anaerobic, and fungal culture.  A second culture was obtained from the gross purulence directly overlying the flexor tendon sheath, and this, too, was sent for Gram stain, aerobic, anaerobic, and fungal cultures.  A synovial rongeur was used to sharply debride inflammatory and purulent material from within the wound to the level of the flexor tendon sheath.  This tissue was discarded.  The A1 pulley was incised longitudinally.  Gross purulence was present.  The pulley system was incised longitudinally over the middle phalanx.  An angiocatheter was placed into the flexor tendon sheath at the A1 pulley, and 400 cc of double antibiotic saline solution was flushed through the flexor tendon sheath.  Next, the webspace was debrided sharply with a synovial rongeur.  The ulnar neurovascular bundle was mobilized radially, and the soft tissue from volar to dorsal was debrided of fibrinous debris and purulence.  This traveled dorsally to the bite wound at the level of the metacarpal head.  The tissue was discarded.  The collar-button abscess was irrigated thoroughly with an additional 400 cc of double antibiotic saline solution.  The hand was rotated, and the bite wound on the dorsal aspect of the index finger metacarpal head was extended to approximately 1.5 cm in length, in a transverse fashion.  Blunt dissection was performed down to the level of the extensor mechanism.  A sharp, excisional  debridement of the inflammatory tissue was performed to the level of the extensor tendon mechanism.  The extensor tendon remained intact.  The abnormal tissue was discarded.  Cloudy fluid was present, but no gross purulence.  The wound was irrigated with 100 cc of double antibiotic saline solution.  The hand was again rotated, and an additional 500 cc of normal saline was utilized to irrigate the soft tissues.  Additionally, normal saline was used to flush the collar-button abscess from volar to dorsal with through and through irrigation of the wounds.  The tourniquet was deflated and hemostasis was achieved.  The skin was loosely closed with interrupted 4-0 nylon suture.  Three strips of quarter inch packing plain were placed, 1 at the volar puncture wound, 1 over the proximal phalanx, and one dorsally at the metacarpal head.  A total of 5 cc 0.25% Marcaine plain was injected for a digital block volarly.  Soft, sterile dressings were applied followed by a bulky dressing.  The patient tolerated the procedure well, and there were no immediate complications.  He was awakened, and brought to the recovery room in stable condition.  All sponge and needle counts are correct at the end of the case.    Post-Operative Plan: The patient will return to the inpatient floor for continued IV antibiotics (IV ertapenem). Infectious disease has been consulted.  He will require a PICC line and IV antibiotics at discharge.  I do not anticipate a return to the OR unless he does not demonstrate adequate clinical response to today's surgical procedure.      Ryanne Ruiz MD

## 2025-01-02 NOTE — PHARMACY-ADMISSION MEDICATION HISTORY
Pharmacist Admission Medication History    Admission medication history is complete. The information provided in this note is only as accurate as the sources available at the time of the update.    Information Source(s): Patient and CareEverywhere/SureScripts via in-person    Pertinent Information:     Changes made to PTA medication list:  Added: None  Deleted: None  Changed: None    Allergies reviewed with patient and updates made in EHR: yes    Medication History Completed By: Nicki Driver PharmD 1/1/2025 8:30 PM    PTA Med List   Medication Sig Last Dose/Taking    aspirin 81 MG EC tablet Take 1 tablet by mouth daily Past Month    telmisartan (MICARDIS) 80 MG tablet Take 80 mg by mouth daily 1/1/2025 Morning

## 2025-01-02 NOTE — ANESTHESIA PREPROCEDURE EVALUATION
Anesthesia Pre-Procedure Evaluation    Patient: Benny Fofana   MRN: [id number] : 1995        Procedure : Procedure(s):  LEFT INDEX FINGER FLEXOR TENDON SHEATH IRRIGATION          Past Medical History:   Diagnosis Date    Hypertension       No past surgical history on file.   Allergies   Allergen Reactions    Azithromycin Hives      Social History     Tobacco Use    Smoking status: Not on file    Smokeless tobacco: Not on file   Substance Use Topics    Alcohol use: Not on file      Wt Readings from Last 1 Encounters:   25 102.1 kg (225 lb)        Anesthesia Evaluation            ROS/MED HX  ENT/Pulmonary: Comment: Vapes    (+)                tobacco use,                        Neurologic:  - neg neurologic ROS     Cardiovascular:     (+)  hypertension- -   -  - -                                      METS/Exercise Tolerance: >4 METS    Hematologic:  - neg hematologic  ROS     Musculoskeletal:  - neg musculoskeletal ROS     GI/Hepatic:  - neg GI/hepatic ROS     Renal/Genitourinary:  - neg Renal ROS     Endo:       Psychiatric/Substance Use:  - neg psychiatric ROS     Infectious Disease: Comment: Infected cat bite      Malignancy:    (-) malignancy   Other:            Physical Exam    Airway        Mallampati: III   TM distance: > 3 FB   Neck ROM: full   Mouth opening: > 3 cm    Respiratory Devices and Support         Dental       (+) Modest Abnormalities - crowns, retainers, 1 or 2 missing teeth    B=Bridge, C=Chipped, L=Loose, M=Missing    Cardiovascular   cardiovascular exam normal          Pulmonary   pulmonary exam normal                OUTSIDE LABS:  CBC:   Lab Results   Component Value Date    WBC 16.8 (H) 2025    WBC 18.7 (H) 2025    HGB 16.5 2025    HGB 17.2 2025    HCT 50.9 2025    HCT 49.4 2025     2025     2025     BMP:   Lab Results   Component Value Date     2025     2025    POTASSIUM 4.3 2025  "   POTASSIUM 4.2 01/01/2025    CHLORIDE 99 01/02/2025    CHLORIDE 96 (L) 01/01/2025    CO2 26 01/02/2025    CO2 24 01/01/2025    BUN 16.6 01/02/2025    BUN 14.2 01/01/2025    CR 1.23 (H) 01/02/2025    CR 1.02 01/01/2025    GLC 95 01/02/2025    GLC 83 01/01/2025     COAGS: No results found for: \"PTT\", \"INR\", \"FIBR\"  POC: No results found for: \"BGM\", \"HCG\", \"HCGS\"  HEPATIC:   Lab Results   Component Value Date    ALBUMIN 4.6 05/18/2024    PROTTOTAL 7.4 05/18/2024    ALT 47 05/18/2024    AST 32 05/18/2024    ALKPHOS 71 05/18/2024    BILITOTAL 0.5 05/18/2024     OTHER:   Lab Results   Component Value Date    LACT 0.8 01/01/2025    RYAN 9.3 01/02/2025    SED 9 01/02/2025       Anesthesia Plan    ASA Status:  2    NPO Status:  NPO Appropriate    Anesthesia Type: General.     - Airway: LMA   Induction: Intravenous, Propofol.   Maintenance: Balanced.        Consents    Anesthesia Plan(s) and associated risks, benefits, and realistic alternatives discussed. Questions answered and patient/representative(s) expressed understanding.     - Discussed:     - Discussed with:  Patient      - Extended Intubation/Ventilatory Support Discussed: No.      - Patient is DNR/DNI Status: No     Use of blood products discussed: No .     Postoperative Care    Pain management: IV analgesics, Multi-modal analgesia.   PONV prophylaxis: Ondansetron (or other 5HT-3), Dexamethasone or Solumedrol     Comments:               Gerard Walker MD    I have reviewed the pertinent notes and labs in the chart from the past 30 days and (re)examined the patient.  Any updates or changes from those notes are reflected in this note.      # Hypochloremia: Lowest Cl = 96 mmol/L in last 2 days, will monitor as appropriate        # Drug Induced Platelet Defect: home medication list includes an antiplatelet medication   # Hypertension: Home medication list includes antihypertensive(s)           # Obesity: Estimated body mass index is 30.52 kg/m  as calculated " from the following:    Height as of this encounter: 1.829 m (6').    Weight as of this encounter: 102.1 kg (225 lb).

## 2025-01-03 LAB
ANION GAP SERPL CALCULATED.3IONS-SCNC: 9 MMOL/L (ref 7–15)
BUN SERPL-MCNC: 17.6 MG/DL (ref 6–20)
CALCIUM SERPL-MCNC: 8.1 MG/DL (ref 8.8–10.4)
CHLORIDE SERPL-SCNC: 102 MMOL/L (ref 98–107)
CREAT SERPL-MCNC: 1.24 MG/DL (ref 0.67–1.17)
CRP SERPL-MCNC: 81.66 MG/L
EGFRCR SERPLBLD CKD-EPI 2021: 81 ML/MIN/1.73M2
ERYTHROCYTE [DISTWIDTH] IN BLOOD BY AUTOMATED COUNT: 14.1 % (ref 10–15)
GLUCOSE SERPL-MCNC: 112 MG/DL (ref 70–99)
HCO3 SERPL-SCNC: 26 MMOL/L (ref 22–29)
HCT VFR BLD AUTO: 41.6 % (ref 40–53)
HGB BLD-MCNC: 13.9 G/DL (ref 13.3–17.7)
MCH RBC QN AUTO: 29.8 PG (ref 26.5–33)
MCHC RBC AUTO-ENTMCNC: 33.4 G/DL (ref 31.5–36.5)
MCV RBC AUTO: 89 FL (ref 78–100)
PLATELET # BLD AUTO: 298 10E3/UL (ref 150–450)
POTASSIUM SERPL-SCNC: 4.2 MMOL/L (ref 3.4–5.3)
RBC # BLD AUTO: 4.66 10E6/UL (ref 4.4–5.9)
SODIUM SERPL-SCNC: 137 MMOL/L (ref 135–145)
WBC # BLD AUTO: 15 10E3/UL (ref 4–11)

## 2025-01-03 PROCEDURE — 86140 C-REACTIVE PROTEIN: CPT | Performed by: STUDENT IN AN ORGANIZED HEALTH CARE EDUCATION/TRAINING PROGRAM

## 2025-01-03 PROCEDURE — 84132 ASSAY OF SERUM POTASSIUM: CPT | Performed by: STUDENT IN AN ORGANIZED HEALTH CARE EDUCATION/TRAINING PROGRAM

## 2025-01-03 PROCEDURE — 82565 ASSAY OF CREATININE: CPT | Performed by: STUDENT IN AN ORGANIZED HEALTH CARE EDUCATION/TRAINING PROGRAM

## 2025-01-03 PROCEDURE — 99232 SBSQ HOSP IP/OBS MODERATE 35: CPT | Performed by: INTERNAL MEDICINE

## 2025-01-03 PROCEDURE — 36415 COLL VENOUS BLD VENIPUNCTURE: CPT | Performed by: STUDENT IN AN ORGANIZED HEALTH CARE EDUCATION/TRAINING PROGRAM

## 2025-01-03 PROCEDURE — 250N000011 HC RX IP 250 OP 636: Performed by: PHYSICIAN ASSISTANT

## 2025-01-03 PROCEDURE — 99232 SBSQ HOSP IP/OBS MODERATE 35: CPT | Performed by: PHYSICIAN ASSISTANT

## 2025-01-03 PROCEDURE — 250N000013 HC RX MED GY IP 250 OP 250 PS 637: Performed by: STUDENT IN AN ORGANIZED HEALTH CARE EDUCATION/TRAINING PROGRAM

## 2025-01-03 PROCEDURE — 250N000011 HC RX IP 250 OP 636: Performed by: STUDENT IN AN ORGANIZED HEALTH CARE EDUCATION/TRAINING PROGRAM

## 2025-01-03 PROCEDURE — 85018 HEMOGLOBIN: CPT | Performed by: STUDENT IN AN ORGANIZED HEALTH CARE EDUCATION/TRAINING PROGRAM

## 2025-01-03 PROCEDURE — 80048 BASIC METABOLIC PNL TOTAL CA: CPT | Performed by: STUDENT IN AN ORGANIZED HEALTH CARE EDUCATION/TRAINING PROGRAM

## 2025-01-03 PROCEDURE — 120N000001 HC R&B MED SURG/OB

## 2025-01-03 RX ORDER — ERTAPENEM 1 G/1
1 INJECTION, POWDER, LYOPHILIZED, FOR SOLUTION INTRAMUSCULAR; INTRAVENOUS EVERY 24 HOURS
Status: SHIPPED
Start: 2025-01-03 | End: 2025-01-23

## 2025-01-03 RX ORDER — HYDROMORPHONE HYDROCHLORIDE 1 MG/ML
1 INJECTION, SOLUTION INTRAMUSCULAR; INTRAVENOUS; SUBCUTANEOUS
Status: COMPLETED | OUTPATIENT
Start: 2025-01-03 | End: 2025-01-03

## 2025-01-03 RX ORDER — KETOROLAC TROMETHAMINE 30 MG/ML
30 INJECTION, SOLUTION INTRAMUSCULAR; INTRAVENOUS EVERY 6 HOURS PRN
Status: DISCONTINUED | OUTPATIENT
Start: 2025-01-03 | End: 2025-01-03

## 2025-01-03 RX ADMIN — POLYETHYLENE GLYCOL 3350 17 G: 17 POWDER, FOR SOLUTION ORAL at 17:08

## 2025-01-03 RX ADMIN — OXYCODONE HYDROCHLORIDE 10 MG: 5 TABLET ORAL at 09:36

## 2025-01-03 RX ADMIN — HYDROMORPHONE HYDROCHLORIDE 0.5 MG: 1 INJECTION, SOLUTION INTRAMUSCULAR; INTRAVENOUS; SUBCUTANEOUS at 19:53

## 2025-01-03 RX ADMIN — OXYCODONE HYDROCHLORIDE 10 MG: 5 TABLET ORAL at 13:38

## 2025-01-03 RX ADMIN — SENNOSIDES AND DOCUSATE SODIUM 1 TABLET: 50; 8.6 TABLET ORAL at 09:25

## 2025-01-03 RX ADMIN — ERTAPENEM SODIUM 1 G: 1 INJECTION, POWDER, LYOPHILIZED, FOR SOLUTION INTRAMUSCULAR; INTRAVENOUS at 21:19

## 2025-01-03 RX ADMIN — KETOROLAC TROMETHAMINE 30 MG: 30 INJECTION, SOLUTION INTRAMUSCULAR at 09:50

## 2025-01-03 RX ADMIN — ACETAMINOPHEN 1000 MG: 500 TABLET, FILM COATED ORAL at 15:24

## 2025-01-03 RX ADMIN — OXYCODONE HYDROCHLORIDE 5 MG: 5 TABLET ORAL at 05:46

## 2025-01-03 RX ADMIN — HYDROMORPHONE HYDROCHLORIDE 0.5 MG: 1 INJECTION, SOLUTION INTRAMUSCULAR; INTRAVENOUS; SUBCUTANEOUS at 18:10

## 2025-01-03 RX ADMIN — OXYCODONE HYDROCHLORIDE 10 MG: 5 TABLET ORAL at 17:02

## 2025-01-03 RX ADMIN — HYDROMORPHONE HYDROCHLORIDE 0.5 MG: 1 INJECTION, SOLUTION INTRAMUSCULAR; INTRAVENOUS; SUBCUTANEOUS at 00:56

## 2025-01-03 RX ADMIN — SENNOSIDES AND DOCUSATE SODIUM 2 TABLET: 50; 8.6 TABLET ORAL at 17:02

## 2025-01-03 RX ADMIN — HYDROMORPHONE HYDROCHLORIDE 1 MG: 1 INJECTION, SOLUTION INTRAMUSCULAR; INTRAVENOUS; SUBCUTANEOUS at 10:54

## 2025-01-03 RX ADMIN — ACETAMINOPHEN 1000 MG: 500 TABLET, FILM COATED ORAL at 21:17

## 2025-01-03 RX ADMIN — OXYCODONE HYDROCHLORIDE 10 MG: 5 TABLET ORAL at 21:17

## 2025-01-03 RX ADMIN — POLYETHYLENE GLYCOL 3350 17 G: 17 POWDER, FOR SOLUTION ORAL at 09:25

## 2025-01-03 RX ADMIN — HYDROMORPHONE HYDROCHLORIDE 0.5 MG: 1 INJECTION, SOLUTION INTRAMUSCULAR; INTRAVENOUS; SUBCUTANEOUS at 06:38

## 2025-01-03 RX ADMIN — HYDROMORPHONE HYDROCHLORIDE 0.5 MG: 1 INJECTION, SOLUTION INTRAMUSCULAR; INTRAVENOUS; SUBCUTANEOUS at 15:24

## 2025-01-03 RX ADMIN — ACETAMINOPHEN 1000 MG: 500 TABLET, FILM COATED ORAL at 09:14

## 2025-01-03 ASSESSMENT — ACTIVITIES OF DAILY LIVING (ADL)
ADLS_ACUITY_SCORE: 55
ADLS_ACUITY_SCORE: 53
ADLS_ACUITY_SCORE: 53
ADLS_ACUITY_SCORE: 55
ADLS_ACUITY_SCORE: 53
ADLS_ACUITY_SCORE: 53
ADLS_ACUITY_SCORE: 55
DEPENDENT_IADLS:: INDEPENDENT
ADLS_ACUITY_SCORE: 53
ADLS_ACUITY_SCORE: 55
ADLS_ACUITY_SCORE: 53
ADLS_ACUITY_SCORE: 55
ADLS_ACUITY_SCORE: 55
ADLS_ACUITY_SCORE: 53
ADLS_ACUITY_SCORE: 55

## 2025-01-03 NOTE — PROGRESS NOTES
[facility]    Medicine Progress Note - Hospitalist Service    Date of Admission:  1/1/2025    Assessment & Plan   Benny Fofana is a 29 year old male admitted on 1/1/2025 with left nondominant hand flexor tenosynovitis after a cat bite, he underwent washout 1/2/25 with orthopedics and will need IV antibiotics on discharge.     Left hand cat bite  Flexor tenosynovitis of the second and third digits of the left hand s/p washout 1/2/25  Leukocytosis, improving  Patient is right-hand dominant and was bit on his left hand on 12/30/2024. He has had progressive erythema and swelling since that point.  At time of presentation he is unable to passively or actively flex his left second and third digit.  His first and second finger are sausage fingers with tenderness of the volar aspect and numbness heading up his wrist. WBC 18.7-16.8-15.0. LA WNL on admission. -82.   Underwent left index finger flexor tendon sheath irritation, release, and irrigation, debridement of 2nd webspace  and extensor tendon 1/2/25. EBL 5mL. Surgeon Dr. Ryanne Ruiz. GETA + Local. MRSA swab neg.  - Inpatient status  - Levofloxacin and metronidazole given penicillin allergy --> changed to Ertapenem by ID 1/2/25, while monitoring closely for any reaction,  tolerated without issue thus far  - Consult to orthopedic hand surgery, greatly appreciate assistance   -s/p OR for washout 01/02/25, see details above  -Continue with IV antibiotics, ertepenem per ID  -Trend inflammatory markers, WBC, and vitals.  -NWB LUE  -Continue pain regimen.   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   -Encourage cessation        Diet: Advance Diet as Tolerated: Regular Diet Adult    DVT Prophylaxis: Ambulate every shift  Jones Catheter: Not present  Lines: None     Cardiac Monitoring: None  Code Status: Full Code      Clinically Significant Risk Factors          # Hypochloremia: Lowest Cl = 96 mmol/L in last 2 days, will monitor as appropriate                     # Obesity: Estimated body mass index is 30.52 kg/m  as calculated from the following:    Height as of this encounter: 1.829 m (6').    Weight as of this encounter: 102.1 kg (225 lb)., PRESENT ON ADMISSION            Social Drivers of Health    Tobacco Use: Medium Risk (7/11/2024)    Received from [employer]    Patient History     Smoking Tobacco Use: Former     Smokeless Tobacco Use: Never     Passive Exposure: Past          Disposition Plan     Medically Ready for Discharge: Anticipated in 2-4 Days           The patient's care was discussed with the Attending Physician, Dr. Sanders, Bedside Nurse, Patient, and Infectious Disease Consultant(s).    Jeanette Diggs PA-C  Hospitalist Service  [facility]  Hospital  Securely message with [employer] (more info)  Text page via [facility] Paging/Directory   ______________________________________________________________________    Interval History   Seen and examined. Significant pain earlier today, required 1 time dose of 1mg IV Dilaudid and 30mg toradol. Discussed stopping NSAIDs due to mild bump in Cr. Reviewed labs and plan with patient and s/o. No new complaints. Sensation intact to left digits, decreased/paresthesias to index finger. CC following, will need IV abx. Questions welcomed and answered to the best of my knowledge.    Physical Exam   Vital Signs: Temp: 98.4  F (36.9  C) Temp src: Oral BP: 105/65 Pulse: 67   Resp: 16 SpO2: 99 % O2 Device: None (Room air)    Weight: 225 lbs 0 oz    General: Awake, alert, very pleasant man who appears stated age. Looks comfortable sitting up in chair. No acute distress.  HEENT: Normocephalic, atraumatic. Extraocular movements intact.   Respiratory: Clear to auscultation bilaterally, no rales, wheezing, or rhonchi.  Cardiovascular: Regular rate and rhythm, +S1 and S2, no murmur auscultated. No peripheral edema.   Gastrointestinal: Soft, non-tender, non-distended. Bowel sounds present.  Skin: Warm, dry. No obvious rashes or lesions on exposed skin. Dorsalis pedis pulses palpable bilaterally.  Musculoskeletal: Left hand splinted. Sensation intact to all digits, index finger with slight decreased sensation/paresthesia sensation.   Neurologic: AAO x3.   Psychiatric: Appropriate mood and affect. No obvious anxiety or depression.       Medical Decision Making       >35 MINUTES SPENT BY ME on the date of service doing chart review, history, exam, documentation & further activities per the note.      Data     I have personally reviewed the following data over the past 24 hrs:    15.0 (H)  \   13.9   / 298     137 102 17.6 /  112 (H)   4.2 26 1.24 (H) \     Procal: N/A CRP: 81.66 (H) Lactic Acid: N/A         Imaging results reviewed over the  past 24 hrs:   No results found for this or any previous visit (from the past 24 hours).

## 2025-01-03 NOTE — CONSULTS
Care Management Initial Consult    General Information  Assessment completed with: Patient, Spouse or significant other, Heather Zapata  Type of CM/SW Visit: Initial Assessment    Primary Care Provider verified and updated as needed: No (pt states he sees a provider when he needs to.  Declined having primary appointment arranged)   Readmission within the last 30 days:        Reason for Consult: discharge planning  Advance Care Planning: Advance Care Planning Reviewed: no concerns identified          Communication Assessment  Patient's communication style: spoken language (English or Bilingual)             Cognitive  Cognitive/Neuro/Behavioral: WDL                      Living Environment:   People in home: significant other  Hetaher  Current living Arrangements: house      Able to return to prior arrangements:         Family/Social Support:  Care provided by: self  Provides care for: no one  Marital Status: Single  Support system: Significant Other, Parent(s)       Heather  Description of Support System: Supportive, Involved         Current Resources:   Patient receiving home care services: No        Community Resources: None  Equipment currently used at home: none  Supplies currently used at home:      Employment/Financial:  Employment Status: employed full-time        Financial Concerns: insurance, none   Referral to Financial Worker: Yes       Does the patient's insurance plan have a 3 day qualifying hospital stay waiver?  No    Lifestyle & Psychosocial Needs:  Social Drivers of Health     Food Insecurity: Not on file   Depression: Not at risk (12/30/2024)    Received from [facility]    PHQ-2     PHQ-2 Subtotal: 0   Housing Stability: Not on file   Tobacco Use: Medium Risk (7/11/2024)    Received from [facility]    Patient History     Smoking Tobacco Use: Former     Smokeless Tobacco Use: Never     Passive Exposure: Past   Financial Resource Strain: Not on file   Alcohol Use: Not on file   Transportation  Needs: Not on file   Physical Activity: Not on file   Interpersonal Safety: Not on file   Stress: Not on file   Social Connections: Not on file   Health Literacy: Not on file       Functional Status:  Prior to admission patient needed assistance:   Dependent ADLs:: Independent  Dependent IADLs:: Independent       Mental Health Status:  Mental Health Status: No Current Concerns       Chemical Dependency Status:  Chemical Dependency Status: No Current Concerns             Values/Beliefs:  Spiritual, Cultural Beliefs, Christianity Practices, Values that affect care: no               Discussed  Partnership in Safe Discharge Planning  document with patient/family: Yes: patient    Additional Information:  Consult placed due to need for IV antibiotics at discharge.  Met with patient and introduced self and role.  Pt significant other, Heather, also involved in conversation.    Pt currently has no insurance and stated he has applied for MA but status is pending.  Financial counselor has been involved and had plan for follow up on 1/9.  CC reached out to Financial counselor today and requested to see if they can reach out to the [facility] to check status-they responded that they will need to do this with patient due to release of information still pending. Requested to have this done today if possible due to being a barrier to discharge and awaiting response.    Discussed with patient home infusion versus outpatient infusion center.  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  Discussed that discharge plan is pending insurance coverage.    Next Steps: Follow for insurance coverage, antibiotic plan, home infusion.    Addendum 1506:  Financial counselor called the [facility] with patient and the [facility] was not taking calls. They were unable to check status of pt medical assistance.  [facility] is not open on the weekend to verify status.  Will need to follow up Monday.    Frieda Melendez RN, BS  Care  Coordinator  [email or web address]

## 2025-01-03 NOTE — PROGRESS NOTES
Orthopedic Surgery  Benny Fofana  01/03/2025     Admit Date:  1/1/2025    POD: 1 Day Post-Op   Procedure(s):  LEFT INDEX FINGER FLEXOR TENDON SHEATH IRRIGATION AND DORSAL HAND IRRIGATION AND DEBRIDEMENT    Patient resting comfortably in bed.    Pain controlled at rest. Dressing CDI  Tolerating oral intake.    Denies nausea or vomiting.  Denies chest pain or shortness of breath.    Temp:  [97.7  F (36.5  C)-98.4  F (36.9  C)] 98.3  F (36.8  C)  Pulse:  [67-88] 84  Resp:  [10-16] 16  BP: (105-143)/(54-91) 137/82  SpO2:  [93 %-99 %] 95 %    Alert and oriented.   Dressing is clean, dry, and intact. Dressing removed, and packing pulled x 3 surgical sites. Surgical sites are well approximated and clean, dry.   Minimal erythema of the surrounding skin.   Left upper extremity is NVI.  Able to flex, extend, abduct, and adduct digits with slight limitation due to swelling  Radial pulse palpable.  Digits are warm and well-perfused.  Sensation intact to light touch.    Labs:  Recent Labs   Lab Test 01/03/25  0723 01/02/25  0652 01/01/25  2048   WBC 15.0* 16.8* 18.7*   HGB 13.9 16.5 17.2    368 368       A/P    1.   Mobilization for DVT prophylaxis, no chemoprophylaxis recommend for upper extremity procedure.   No lifting greater than weight of full coffee cup   Antibiotic recommendations per ID. Appreciate their input.    Continue current pain regimen   Dressings: Keep intact.  Change if >50% saturated or peeling off.    Follow-up: 1 week post-op with Dr. Ruiz     2. Disposition   Anticipate d/c to home once abx plan established       Kelley Villalobos PA-C  [facility]

## 2025-01-03 NOTE — PROGRESS NOTES
[facility]    Infectious Disease Progress Note    Date of Service (when I saw the patient): 01/03/2025     Assessment & Plan   Benny Fofana is a 29 year old male who was admitted on 1/1/2025.     Impression:  30 yo presented with cat bite to the left index finger near the MCP joint on 12/30/24 with subsequent pain, swelling, and erythema.   Amoxicillin listed as allergy, allergy being hives   On levaquin and flagyl   Seen by ortho plan is I and D   I spoke with patient's mother who is a nurse who remembers hives no anaphylaxis history  Cat is up-to-date with vaccination per patient     Recommendations   IV ertapenem, keep a close eye for any reaction, so far patient has tolerated it without any issues  Spoke with orthopedics concern for tenosynovitis in the OR  Will need IV antibiotics for discharge  Anticipate need for midline and IV antibiotics for discharge  Order for ertapenem in the chart for when ready for discharge  Follow-up with Ortho and ID before stopping antibiotics         Marshall Rodgers MD    Interval History   Tolerating antibiotics ok   No new rashes or issues with antibiotics   Labs reviewed   No changes to past medical, social or family history   Tolerated IV ertapenem without any rash/ hives      Physical Exam   Temp: 98.4  F (36.9  C) Temp src: Oral BP: 105/65 Pulse: 67   Resp: 16 SpO2: 99 % O2 Device: None (Room air)    Vitals:    01/02/25 1341   Weight: 102.1 kg (225 lb)     Vital Signs with Ranges  Temp:  [97.7  F (36.5  C)-98.4  F (36.9  C)] 98.4  F (36.9  C)  Pulse:  [] 67  Resp:  [10-16] 16  BP: (105-143)/(21-91) 105/65  SpO2:  [93 %-99 %] 99 %    Constitutional: Awake, alert, cooperative, no apparent distress  Lungs: Clear to auscultation bilaterally, no crackles or wheezing  Cardiovascular: Regular rate and rhythm, normal S1 and S2, and no murmur noted  Abdomen: Normal bowel sounds, soft, non-distended, non-tender  Skin: No rashes, no cyanosis, no  "edema  Other:    Medications   Current Facility-Administered Medications   Medication Dose Route Frequency Provider Last Rate Last Admin    sodium chloride 0.9 % infusion   Intravenous Continuous Quiana Arellano PA-C 100 mL/hr at 01/02/25 2159 New Bag at 01/02/25 2159     Current Facility-Administered Medications   Medication Dose Route Frequency Provider Last Rate Last Admin    acetaminophen (TYLENOL) tablet 1,000 mg  1,000 mg Oral TID Quiana Arellano PA-C   1,000 mg at 01/03/25 0914    ertapenem (INVanz) 1 g vial to attach to  mL bag  1 g Intravenous Q24H Quiana Arellano PA-C   1 g at 01/02/25 2111    polyethylene glycol (MIRALAX) Packet 17 g  17 g Oral Daily Quiana Arellano PA-C   17 g at 01/03/25 0925    senna-docusate (SENOKOT-S/PERICOLACE) 8.6-50 MG per tablet 1 tablet  1 tablet Oral BID Quiana Arellano PA-C   1 tablet at 01/03/25 0925    sodium chloride (PF) 0.9% PF flush 3 mL  3 mL Intracatheter Q8H Quiana Arellano PA-C   3 mL at 01/03/25 1054       Data   All microbiology laboratory data reviewed.  Recent Labs   Lab Test 01/03/25  0723 01/02/25  0652 01/01/25 2048   WBC 15.0* 16.8* 18.7*   HGB 13.9 16.5 17.2   HCT 41.6 50.9 49.4   MCV 89 91 88    368 368     Recent Labs   Lab Test 01/03/25  0723 01/02/25  0652 01/01/25  2048   CR 1.24* 1.23* 1.02     Recent Labs   Lab Test 01/02/25  0652   SED 9     No lab results found.    Invalid input(s): \"UC\"    All cultures:  Recent Labs   Lab 01/02/25  1520 01/02/25  1518 01/01/25 2048   CULTURE No growth, less than 1 day  See corresponding culture for results Culture in progress  See corresponding culture for results No growth after 1 day  No growth after 1 day      Blood culture:  Results for orders placed or performed during the hospital encounter of 01/01/25   Blood Culture Peripheral Blood    Collection Time: 01/01/25  8:48 PM    Specimen: Peripheral Blood   Result Value Ref Range    Culture No growth after 1 day    Blood " Culture Peripheral Blood    Collection Time: 01/01/25  8:48 PM    Specimen: Peripheral Blood   Result Value Ref Range    Culture No growth after 1 day       Urine culture:  No results found for this or any previous visit.

## 2025-01-03 NOTE — PLAN OF CARE
Goal Outcome Evaluation:      Plan of Care Reviewed With: patient    Overall Patient Progress: improvingOverall Patient Progress: improving         Date & Time: 1/3/2025 4226-4716  Summary: ED admit with cellulitis of left hand from a cat bite on 12/30/25  Behavior & Aggression: Green   Fall Risk: No   Orientation: A&Ox4  ABNL VS/O2:VSS on RA-lungs clear  Pain Management:PRN 10 mg oxy and o.5 mg breakthrough IV dilaudid. Oxy x 2 this shift. Dilaudid x 1 Scheduled tylenol. Pt reported pain was not controlled overnight, he had a dose of toradol but was dc'd due to increase in creatinine 1.24. had one time dose of 1 mg IV dilaudid. Pain was 7/10, now 4-5/10.  Bowel/Bladder: Continent-voids independent in bathroom.  Drains: PIV SL - Intermittent IV antibiotics, will need PICC for long term antibiotics at discharge.  Wounds/incisions: L hand wrapped with gauze and ace wrap, talked with ortho and they will change it this afternoon when they round  Diet: Regular-tolerating well with no nausea  Activity Level: IND   Tests/Procedures:   Anticipated  DC Date: Pending   ID, ortho, hospitalist, care coordinator following-insurance pending.

## 2025-01-03 NOTE — PROGRESS NOTES
Date & Time: 0000-0700  Summary: ED admit with cellulitis of left hand from a cat bite on 10/30  Behavior & Aggression: Green   Fall Risk: No   Orientation: A&Ox4  ABNL VS/O2:VSS on RA    Pain Management:PRN oxy and dilaudid. Oxy x1 this shift. Dilaudid x2 this shift. Scheduled tylenol  Bowel/Bladder: Continent   Drains: PIV SL - Intermittent IV antibiotics  Wounds/incisions: L hand redness, swelling, and scabs   Diet: Advance as tolerated  Activity Level: IND   Tests/Procedures:   Anticipated  DC Date: Pending

## 2025-01-04 ENCOUNTER — HOME INFUSION (OUTPATIENT)
Dept: HOME HEALTH SERVICES | Facility: HOME HEALTH | Age: 30
End: 2025-01-04

## 2025-01-04 ENCOUNTER — ENROLLMENT (OUTPATIENT)
Dept: HOME HEALTH SERVICES | Facility: HOME HEALTH | Age: 30
End: 2025-01-04
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
## 2025-01-04 DIAGNOSIS — M65.949 FLEXOR TENOSYNOVITIS OF FINGER: ICD-10-CM

## 2025-01-04 DIAGNOSIS — L03.114 CELLULITIS OF LEFT HAND: Primary | ICD-10-CM

## 2025-01-04 LAB
ANION GAP SERPL CALCULATED.3IONS-SCNC: 7 MMOL/L (ref 7–15)
BUN SERPL-MCNC: 11.1 MG/DL (ref 6–20)
CALCIUM SERPL-MCNC: 8.3 MG/DL (ref 8.8–10.4)
CHLORIDE SERPL-SCNC: 104 MMOL/L (ref 98–107)
CREAT SERPL-MCNC: 1.06 MG/DL (ref 0.67–1.17)
CRP SERPL-MCNC: 42.91 MG/L
EGFRCR SERPLBLD CKD-EPI 2021: >90 ML/MIN/1.73M2
GLUCOSE SERPL-MCNC: 90 MG/DL (ref 70–99)
HCO3 SERPL-SCNC: 28 MMOL/L (ref 22–29)
POTASSIUM SERPL-SCNC: 4.6 MMOL/L (ref 3.4–5.3)
SODIUM SERPL-SCNC: 139 MMOL/L (ref 135–145)
WBC # BLD AUTO: 9.3 10E3/UL (ref 4–11)

## 2025-01-04 PROCEDURE — 80048 BASIC METABOLIC PNL TOTAL CA: CPT | Performed by: PHYSICIAN ASSISTANT

## 2025-01-04 PROCEDURE — 250N000011 HC RX IP 250 OP 636: Performed by: STUDENT IN AN ORGANIZED HEALTH CARE EDUCATION/TRAINING PROGRAM

## 2025-01-04 PROCEDURE — 36415 COLL VENOUS BLD VENIPUNCTURE: CPT | Performed by: PHYSICIAN ASSISTANT

## 2025-01-04 PROCEDURE — 82565 ASSAY OF CREATININE: CPT | Performed by: PHYSICIAN ASSISTANT

## 2025-01-04 PROCEDURE — 85048 AUTOMATED LEUKOCYTE COUNT: CPT | Performed by: PHYSICIAN ASSISTANT

## 2025-01-04 PROCEDURE — 120N000001 HC R&B MED SURG/OB

## 2025-01-04 PROCEDURE — 250N000013 HC RX MED GY IP 250 OP 250 PS 637: Performed by: STUDENT IN AN ORGANIZED HEALTH CARE EDUCATION/TRAINING PROGRAM

## 2025-01-04 PROCEDURE — 86140 C-REACTIVE PROTEIN: CPT | Performed by: STUDENT IN AN ORGANIZED HEALTH CARE EDUCATION/TRAINING PROGRAM

## 2025-01-04 PROCEDURE — 82435 ASSAY OF BLOOD CHLORIDE: CPT | Performed by: PHYSICIAN ASSISTANT

## 2025-01-04 PROCEDURE — 99233 SBSQ HOSP IP/OBS HIGH 50: CPT | Performed by: STUDENT IN AN ORGANIZED HEALTH CARE EDUCATION/TRAINING PROGRAM

## 2025-01-04 RX ADMIN — ACETAMINOPHEN 650 MG: 325 TABLET, FILM COATED ORAL at 00:34

## 2025-01-04 RX ADMIN — HYDROMORPHONE HYDROCHLORIDE 0.3 MG: 1 INJECTION, SOLUTION INTRAMUSCULAR; INTRAVENOUS; SUBCUTANEOUS at 15:23

## 2025-01-04 RX ADMIN — OXYCODONE HYDROCHLORIDE 10 MG: 5 TABLET ORAL at 14:24

## 2025-01-04 RX ADMIN — HYDROMORPHONE HYDROCHLORIDE 0.5 MG: 1 INJECTION, SOLUTION INTRAMUSCULAR; INTRAVENOUS; SUBCUTANEOUS at 22:11

## 2025-01-04 RX ADMIN — HYDROMORPHONE HYDROCHLORIDE 0.5 MG: 1 INJECTION, SOLUTION INTRAMUSCULAR; INTRAVENOUS; SUBCUTANEOUS at 04:48

## 2025-01-04 RX ADMIN — OXYCODONE HYDROCHLORIDE 10 MG: 5 TABLET ORAL at 18:55

## 2025-01-04 RX ADMIN — OXYCODONE HYDROCHLORIDE 10 MG: 5 TABLET ORAL at 09:35

## 2025-01-04 RX ADMIN — POLYETHYLENE GLYCOL 3350 17 G: 17 POWDER, FOR SOLUTION ORAL at 09:36

## 2025-01-04 RX ADMIN — ACETAMINOPHEN 1000 MG: 500 TABLET, FILM COATED ORAL at 09:35

## 2025-01-04 RX ADMIN — SENNOSIDES AND DOCUSATE SODIUM 1 TABLET: 50; 8.6 TABLET ORAL at 20:08

## 2025-01-04 RX ADMIN — HYDROMORPHONE HYDROCHLORIDE 0.5 MG: 1 INJECTION, SOLUTION INTRAMUSCULAR; INTRAVENOUS; SUBCUTANEOUS at 19:48

## 2025-01-04 RX ADMIN — SENNOSIDES AND DOCUSATE SODIUM 1 TABLET: 50; 8.6 TABLET ORAL at 09:35

## 2025-01-04 RX ADMIN — HYDROMORPHONE HYDROCHLORIDE 0.5 MG: 1 INJECTION, SOLUTION INTRAMUSCULAR; INTRAVENOUS; SUBCUTANEOUS at 11:03

## 2025-01-04 RX ADMIN — HYDROMORPHONE HYDROCHLORIDE 0.5 MG: 1 INJECTION, SOLUTION INTRAMUSCULAR; INTRAVENOUS; SUBCUTANEOUS at 00:34

## 2025-01-04 RX ADMIN — ERTAPENEM SODIUM 1 G: 1 INJECTION, POWDER, LYOPHILIZED, FOR SOLUTION INTRAMUSCULAR; INTRAVENOUS at 20:08

## 2025-01-04 RX ADMIN — ACETAMINOPHEN 1000 MG: 500 TABLET, FILM COATED ORAL at 16:19

## 2025-01-04 RX ADMIN — OXYCODONE HYDROCHLORIDE 10 MG: 5 TABLET ORAL at 04:40

## 2025-01-04 RX ADMIN — HYDROMORPHONE HYDROCHLORIDE 0.5 MG: 1 INJECTION, SOLUTION INTRAMUSCULAR; INTRAVENOUS; SUBCUTANEOUS at 17:36

## 2025-01-04 ASSESSMENT — ACTIVITIES OF DAILY LIVING (ADL)
ADLS_ACUITY_SCORE: 55

## 2025-01-04 NOTE — PLAN OF CARE
Goal Outcome Evaluation:      Plan of Care Reviewed With: patient, significant other    Overall Patient Progress: improvingOverall Patient Progress: improving    Outcome Evaluation: Patient is still working on Pain management; PICC will be placed for Long Term ABX    Date & Time: 1/3/2025 3039-4054  Summary: ED admit with cellulitis of left hand from a cat bite on 12/30/25  Behavior & Aggression: Green   Fall Risk: No   Orientation: A&Ox4  ABNL VS/O2:VSS on RA-lungs clear  Pain Management:PRN PO Oxycodone 10 mg x1; PRN IV Dilaudid 0.5 mg x1  Bowel/Bladder: Continent.  Drains: PIV SL - Intermittent IV antibiotics, will need PICC for long term antibiotics at discharge.  Wounds/incisions: L hand wrapped with gauze and ace wrap; Ortho changed bandage beginning of shift: Sutures with minimal Erythema.   Diet: Regular-tolerating well with no nausea  Activity Level: IND   Tests/Procedures:   Anticipated  DC Date: Pending   ID, ortho, hospitalist, care coordinator following

## 2025-01-04 NOTE — PROGRESS NOTES
Orthopedic Surgery  Benny Fofana  01/04/2025     Admit Date:  1/1/2025    POD: 2 Days Post-Op   Procedure(s):  LEFT INDEX FINGER FLEXOR TENDON SHEATH IRRIGATION AND DORSAL HAND IRRIGATION AND DEBRIDEMENT    Patient resting comfortably in bed, states he's about to take a nap and has no complaints at this time.    Pain controlled at rest. Dressing CDI.  Tolerating oral intake.    Denies nausea or vomiting.  Denies chest pain or shortness of breath.    Temp:  [97.7  F (36.5  C)-98.3  F (36.8  C)] 97.7  F (36.5  C)  Pulse:  [55-84] 55  Resp:  [16-20] 18  BP: (110-146)/(49-82) 110/49  SpO2:  [95 %-98 %] 96 %    Alert and oriented. No acute distress.   Ace wrap removed and underlying dressing is clean, dry, and intact.   Able to flex, extend, abduct, and adduct digits with slight limitation due to swelling.  Full active range of motion of wrist.  Forearm compartments are compressible and nontender.  Left upper extremity is NVI.  Radial pulse palpable.  Digits are warm and well-perfused.  Sensation intact to light touch.    Labs:  Recent Labs   Lab Test 01/04/25  0707 01/03/25  0723 01/02/25  0652 01/01/25  2048   WBC 9.3 15.0* 16.8* 18.7*   HGB  --  13.9 16.5 17.2   PLT  --  298 368 368       A/P    1.   Mobilization for DVT prophylaxis, no chemoprophylaxis recommend for upper extremity procedure.   No lifting greater than weight of full coffee cup   Antibiotic recommendations per ID. Midline to be placed today for IV abx on discharge.   Continue current pain regimen   Dressings: Daily soaks and dressing changes.   Follow-up: 1 week post-op with Dr. Ruiz     2. Disposition   Anticipate d/c to home tomorrow.      Vianey Montoya PA-C  [facility]

## 2025-01-04 NOTE — PLAN OF CARE
Date & Time: 1/4/2025 1679-5274  Surgery/POD#: POD2: LEFT INDEX FINGER FLEXOR TENDON SHEATH IRRIGATION AND DORSAL HAND IRRIGATION AND DEBRIDEMENT  Behavior & Aggression: Green  Fall Risk: No  Orientation:A&O X4  ABNL VS/O2:VSS RA  ABNL Labs: See results  Pain Management: Scheduled Tylenol, PRN Oxy and Dilaudid  Bowel/Bladder: Continent of B&B  Drains: PIV SL - Intermittent IV antibiotics, will need PICC for long term antibiotics at discharge.  Wounds/incisions: L hand incisions. Wrapped in ACE bandage  Diet:Reg  Activity Level: Ind  Tests/Procedures:   Anticipated  DC Date: Pending  Significant Information: ID, ortho, hospitalist, care coordinator following

## 2025-01-04 NOTE — PROGRESS NOTES
[facility]    Medicine Progress Note - Hospitalist Service    Date of Admission:  1/1/2025    Assessment & Plan     Benny Fofana is a 29 year old male admitted on 1/1/2025 with left nondominant hand flexor tenosynovitis after a cat bite, he underwent washout 1/2/25 with orthopedics and will need IV antibiotics on discharge. Still requiring IV pain medication on 1/4. Anticipate medically stable for discharge on 1/5.      Left hand cat bite  Flexor tenosynovitis of the second and third digits of the left hand s/p washout 1/2/25  Leukocytosis, improving  Patient is right-hand dominant and was bit on his left hand on 12/30/2024. He has had progressive erythema and swelling since that point.  At time of presentation he is unable to passively or actively flex his left second and third digit.  His first and second finger are sausage fingers with tenderness of the volar aspect and numbness heading up his wrist. WBC 18.7-16.8-15.0. LA WNL on admission. -82.   Underwent left index finger flexor tendon sheath irritation, release, and irrigation, debridement of 2nd webspace  and extensor tendon 1/2/25. EBL 5mL. Surgeon Dr. Ryanne Ruiz. GETA + Local. MRSA swab neg.  - Inpatient status  - Levofloxacin and metronidazole given penicillin allergy --> changed to Ertapenem by ID 1/2/25, while monitoring closely for any reaction,  tolerated without issue thus far  - Consult to orthopedic hand surgery, greatly appreciate assistance              -s/p OR for washout 01/02/25, see details above  -Continue with IV antibiotics, ertepenem per ID  -Trend inflammatory markers, WBC, and vitals.  -NWB LUE  -Continue pain regimen.   -Encourage elevation  - Infectious disease consult pending surgical cultures, appreciate assistance              -Changed to Ertapenem as above  -Will need midline line placement and 3 weeks of IV abx on discharge, I placed vascular access consult for midline placement on  1/4  -Care Coordinator assisting with arranging home IV therapy  -Follow-up with Ortho and ID before stopping antibiotics   -- Follow up OR Cultures, pending  -- Trend labs and CRP  - Pain control:  -Scheduled APAP 1gm TID  -Oxycodone 5-10mg Q 4 hours PRN  -IV Dilaudid 0.3-0.5 Q 2 hours PRN  -- Scheduled bowel regimen  -- Financial counselor involved as patient does not have insurance  -- 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   -Encourage cessation        Diet: Advance Diet as Tolerated: Regular Diet Adult    DVT Prophylaxis: Pneumatic Compression Devices  Jones Catheter: Not present  Lines: None     Cardiac Monitoring: None  Code Status: Full Code      Clinically Significant Risk Factors                              # Obesity: Estimated body mass index is 30.52 kg/m  as calculated from the following:    Height as of this encounter: 1.829 m (6').    Weight as of this encounter: 102.1 kg (225 lb)., PRESENT ON ADMISSION            Social Drivers of Health    Tobacco Use: Medium Risk (7/11/2024)    Received from [facility]    Patient History     Smoking Tobacco Use: Former     Smokeless Tobacco Use: Never     Passive Exposure: Past          Disposition Plan     Medically Ready for Discharge: Anticipated Tomorrow             Germain Villareal DO  Hospitalist Service  [facility]  Securely message with FreeATM (more info)  Text page via Q Chip  Lisandra/y   ______________________________________________________________________    Interval History     - No acute events overnight  - Patient reports severe pain this morning in the left wrist  - Still requiring intermittent IV pain medication  - No other acute concerns at this time   - Updated patient and mother at bedside     Physical Exam   Vital Signs: Temp: 97.7  F (36.5  C) Temp src: Oral BP: 110/49 Pulse: 55   Resp: 20 SpO2: 96 % O2 Device: None (Room air)    Weight: 225 lbs 0 oz    Constitutional: awake, alert, cooperative, no apparent distress, and appears stated age  Eyes: Lids and lashes normal, pupils equal   ENT: Normocephalic, without obvious abnormality, atraumatic   Respiratory: No increased work of breathing, good air exchange, clear to auscultation bilaterally, no crackles or wheezing  Cardiovascular: Normal apical impulse, regular rate and rhythm, normal S1 and S2, no S3 or S4, and no murmur noted  GI: No scars, normal bowel sounds, soft, non-distended, non-tender, no masses palpated, no hepatosplenomegally  Skin: Left hand in post-operative splint. No erythema or fluctuance extending proximally from left arm splint.   Musculoskeletal: No obvious deformity besides left hand splint  Neurologic: Awake, alert, oriented to name, place and time.  Cranial nerves II-XII are grossly intact.   Neuropsychiatric: General: normal, calm, and normal eye contact    Medical Decision Making       53 MINUTES SPENT BY ME on the date of service doing chart review, history, exam, documentation & further activities per the note.      Data   ------------------------- PAST 24 HR DATA REVIEWED -----------------------------------------------    I have personally reviewed the following data over the past 24 hrs:    9.3  \   N/A   / N/A     139 104 11.1 /  90   4.6 28 1.06 \     Procal: N/A CRP: 42.91 (H) Lactic Acid: N/A         Imaging results reviewed over the past 24 hrs:   No results found for this or any  previous visit (from the past 24 hours).

## 2025-01-04 NOTE — PROGRESS NOTES
Received order for midline placement for home IV antibiotics.  Care coordination currently working on insurance issue and home infusion services at this time. No final discharge plan in place at this time.  VAT aware of order and will follow and place line once final discharge plan is known  Discussed with unit RN and patient not discharging today

## 2025-01-04 NOTE — PROGRESS NOTES
Care Management Follow Up    Length of Stay (days): 3    Expected Discharge Date: 01/05/2025     Concerns to be Addressed: discharge planning  no insurance, needs IV antibiotics  Patient plan of care discussed at interdisciplinary rounds: Yes    Anticipated Discharge Disposition: Home, Home Infusion              Anticipated Discharge Services:    Anticipated Discharge DME:      Patient/family educated on Medicare website which has current facility and service quality ratings:    Education Provided on the Discharge Plan:    Patient/Family in Agreement with the Plan: yes    Referrals Placed by CM/SW:    Private pay costs discussed:  cost of home infusion    Discussed  Partnership in Safe Discharge Planning  document with patient/family: Yes: patient     Handoff Completed: No, handoff not indicated or clinically appropriate    Additional Information:  Followed up with patient regarding insurance coverage and FC note that they were unable to speak with [facility] together yesterday. Pt shared that he spoke with [facility] later in the day and was told he won't have coverage until February and his coverage won't be retroactive.      Benefit check done with [facility] for private pay cost of home infusion for Ertapenam 1gm q24 hrs would be $180/day.  Cost of nurse for labs and line care would be $90 for 2 hours each visit, if visit were to go beyond 2 hours the additional cost would be $45/hr for additional hour.  Discussed private pay cost estimate with patient.  Patient stated he would not be able to pay that all at once and asking how that would work. CC spoke with Gina at [facility] to inquire about what payment options/plans would be available to pt and she stated that would be a questions that would need to be discussed with billing on Monday.    Next Steps: Follow up with [facility] Monday to inquire about payment options/plans for patient    Frieda Melendez RN, BS  Care Coordinator  [email or web address]

## 2025-01-04 NOTE — PLAN OF CARE
Date & Time: 1/4/2025 3366-3543  Surgery/POD#: POD 2: LEFT INDEX FINGER FLEXOR TENDON SHEATH IRRIGATION AND DORSAL HAND IRRIGATION AND DEBRIDEMENT  Behavior & Aggression: Green  Fall Risk: No  Orientation:A&O X4  ABNL VS/O2:VSS RA  ABNL Labs: See results  Pain Management: Scheduled Tylenol. PRN Oxy and Dilaudid x2 and ice pack    Bowel/Bladder: Continent of B&B  Drains: PIV SL - Intermittent IV antibiotics, will need PICC or midline for long term antibiotics at discharge.  Wounds/incisions: L hand incisions. Wrapped in ACE bandage/ CDI. Dressing change to be done this evening.  Diet:Reg  Activity Level: Ind  Tests/Procedures:   Anticipated  DC Date: Pending  Significant Information: Slight numbness/tingling in LIZETH; cap refill WNL; arm elevated on pillow. Midline IV placement for long term IV abx will be done closer to discharge day, per vascular team. ID, ortho, hospitalist, care coordinator following

## 2025-01-05 VITALS
OXYGEN SATURATION: 96 % | HEART RATE: 66 BPM | WEIGHT: 225 LBS | RESPIRATION RATE: 16 BRPM | SYSTOLIC BLOOD PRESSURE: 131 MMHG | TEMPERATURE: 98.2 F | BODY MASS INDEX: 30.48 KG/M2 | HEIGHT: 72 IN | DIASTOLIC BLOOD PRESSURE: 62 MMHG

## 2025-01-05 LAB — CRP SERPL-MCNC: 38.75 MG/L

## 2025-01-05 PROCEDURE — 250N000013 HC RX MED GY IP 250 OP 250 PS 637: Performed by: STUDENT IN AN ORGANIZED HEALTH CARE EDUCATION/TRAINING PROGRAM

## 2025-01-05 PROCEDURE — 999N000040 HC STATISTIC CONSULT NO CHARGE VASC ACCESS

## 2025-01-05 PROCEDURE — 272N000433 HC KIT CATH IV 18 OR 20G CM, POWERGLIDE W MAX BARRIER

## 2025-01-05 PROCEDURE — 99239 HOSP IP/OBS DSCHRG MGMT >30: CPT | Performed by: STUDENT IN AN ORGANIZED HEALTH CARE EDUCATION/TRAINING PROGRAM

## 2025-01-05 PROCEDURE — 36415 COLL VENOUS BLD VENIPUNCTURE: CPT | Performed by: STUDENT IN AN ORGANIZED HEALTH CARE EDUCATION/TRAINING PROGRAM

## 2025-01-05 PROCEDURE — 36569 INSJ PICC 5 YR+ W/O IMAGING: CPT

## 2025-01-05 PROCEDURE — 86140 C-REACTIVE PROTEIN: CPT | Performed by: STUDENT IN AN ORGANIZED HEALTH CARE EDUCATION/TRAINING PROGRAM

## 2025-01-05 PROCEDURE — 250N000009 HC RX 250: Performed by: STUDENT IN AN ORGANIZED HEALTH CARE EDUCATION/TRAINING PROGRAM

## 2025-01-05 PROCEDURE — 250N000011 HC RX IP 250 OP 636: Performed by: STUDENT IN AN ORGANIZED HEALTH CARE EDUCATION/TRAINING PROGRAM

## 2025-01-05 RX ORDER — ACETAMINOPHEN 500 MG
1000 TABLET ORAL 3 TIMES DAILY PRN
Qty: 60 TABLET | Refills: 0 | Status: SHIPPED | OUTPATIENT
Start: 2025-01-05

## 2025-01-05 RX ORDER — IBUPROFEN 600 MG/1
600 TABLET, FILM COATED ORAL EVERY 8 HOURS PRN
Qty: 60 TABLET | Refills: 0 | Status: SHIPPED | OUTPATIENT
Start: 2025-01-05

## 2025-01-05 RX ORDER — OXYCODONE HYDROCHLORIDE 10 MG/1
10 TABLET ORAL EVERY 4 HOURS PRN
Qty: 8 TABLET | Refills: 0 | Status: SHIPPED | OUTPATIENT
Start: 2025-01-05

## 2025-01-05 RX ORDER — CHLORHEXIDINE GLUCONATE 40 MG/ML
SOLUTION TOPICAL DAILY
Qty: 946 ML | Refills: 1 | Status: SHIPPED | OUTPATIENT
Start: 2025-01-05 | End: 2025-01-12

## 2025-01-05 RX ADMIN — LIDOCAINE HYDROCHLORIDE ANHYDROUS 1 ML: 10 INJECTION, SOLUTION INFILTRATION at 14:27

## 2025-01-05 RX ADMIN — HYDROMORPHONE HYDROCHLORIDE 0.5 MG: 1 INJECTION, SOLUTION INTRAMUSCULAR; INTRAVENOUS; SUBCUTANEOUS at 11:52

## 2025-01-05 RX ADMIN — ERTAPENEM SODIUM 1 G: 1 INJECTION, POWDER, LYOPHILIZED, FOR SOLUTION INTRAMUSCULAR; INTRAVENOUS at 17:17

## 2025-01-05 RX ADMIN — HYDROMORPHONE HYDROCHLORIDE 0.5 MG: 1 INJECTION, SOLUTION INTRAMUSCULAR; INTRAVENOUS; SUBCUTANEOUS at 03:15

## 2025-01-05 RX ADMIN — OXYCODONE HYDROCHLORIDE 10 MG: 5 TABLET ORAL at 04:06

## 2025-01-05 RX ADMIN — OXYCODONE HYDROCHLORIDE 10 MG: 5 TABLET ORAL at 08:50

## 2025-01-05 RX ADMIN — HYDROMORPHONE HYDROCHLORIDE 0.5 MG: 1 INJECTION, SOLUTION INTRAMUSCULAR; INTRAVENOUS; SUBCUTANEOUS at 14:13

## 2025-01-05 RX ADMIN — HYDROMORPHONE HYDROCHLORIDE 0.5 MG: 1 INJECTION, SOLUTION INTRAMUSCULAR; INTRAVENOUS; SUBCUTANEOUS at 05:33

## 2025-01-05 RX ADMIN — POLYETHYLENE GLYCOL 3350 17 G: 17 POWDER, FOR SOLUTION ORAL at 08:50

## 2025-01-05 RX ADMIN — ACETAMINOPHEN 1000 MG: 500 TABLET, FILM COATED ORAL at 16:01

## 2025-01-05 RX ADMIN — OXYCODONE HYDROCHLORIDE 10 MG: 5 TABLET ORAL at 13:12

## 2025-01-05 RX ADMIN — HYDROMORPHONE HYDROCHLORIDE 0.5 MG: 1 INJECTION, SOLUTION INTRAMUSCULAR; INTRAVENOUS; SUBCUTANEOUS at 16:26

## 2025-01-05 RX ADMIN — OXYCODONE HYDROCHLORIDE 10 MG: 5 TABLET ORAL at 00:01

## 2025-01-05 RX ADMIN — SENNOSIDES AND DOCUSATE SODIUM 1 TABLET: 50; 8.6 TABLET ORAL at 08:50

## 2025-01-05 RX ADMIN — ACETAMINOPHEN 1000 MG: 500 TABLET, FILM COATED ORAL at 08:50

## 2025-01-05 RX ADMIN — OXYCODONE HYDROCHLORIDE 10 MG: 5 TABLET ORAL at 17:36

## 2025-01-05 ASSESSMENT — ACTIVITIES OF DAILY LIVING (ADL)
ADLS_ACUITY_SCORE: 55

## 2025-01-05 NOTE — PROGRESS NOTES
Orthopedic Surgery  Benny Fofana  01/05/2025     Admit Date:  1/1/2025    POD: 3 Days Post-Op   Procedure(s):  LEFT INDEX FINGER FLEXOR TENDON SHEATH IRRIGATION AND DORSAL HAND IRRIGATION AND DEBRIDEMENT    Patient resting comfortably in bed hoping to go home soon.  Significant other in room.   Waiting on PICC for long term antibiotics when discharged (ertapenem per ID)   Pain controlled at rest. Dressing is CDI.  Tolerating oral intake.    Denies nausea or vomiting.  Denies chest pain or shortness of breath.    Temp:  [97.9  F (36.6  C)-99.6  F (37.6  C)] 97.9  F (36.6  C)  Pulse:  [65-76] 65  Resp:  [16-20] 16  BP: (115-139)/(65-88) 119/71  SpO2:  [94 %-96 %] 96 %    Alert and oriented. No acute distress.   Ace wrap removed and underlying dressing is intact with very minimal dried serous drainage.    Surgical sites are well approximated and clean, dry. Dressings changed.   Minimal erythema of the surrounding skin.   Able to flex, extend, abduct, and adduct digits without difficulty.  Full active range of motion of wrist.  Forearm compartments are compressible and nontender.  Left upper extremity is NVI.  Radial pulse palpable.  Digits are warm and well-perfused.  Sensation intact to light touch.    Labs:  Recent Labs   Lab Test 01/04/25  0707 01/03/25  0723 01/02/25  0652 01/01/25  2048   WBC 9.3 15.0* 16.8* 18.7*   HGB  --  13.9 16.5 17.2   PLT  --  298 368 368       A/P    1.   Mobilization for DVT prophylaxis, no chemoprophylaxis recommend for upper extremity procedure.   No lifting greater than weight of full coffee cup   Antibiotic recommendations per ID. Midline to be placed prior to discharge.    Continue current pain regimen   Dressings: Daily soaks and dressing changes.   Follow-up: 1 week post-op with Dr. Ruiz. Patient advised to call the clinic tomorrow to arrange.    2. Disposition   Anticipate d/c to home once PICC line placed.  Delay due to financial discussions.      Vianey Montoya,  RADHA  [facility]

## 2025-01-05 NOTE — PLAN OF CARE
Goal Outcome Evaluation:    Summary: POD# 2: LEFT INDEX FINGER FLEXOR TENDON SHEATH IRRIGATION AND DORSAL HAND IRRIGATION AND DEBRIDEMENT      Date & Time: 1/4/25 0880-7922   Orientation: A&O x4   Activity Level: Independent   Fall Risk: No   Behavior & Aggression: Green     Pain Management: Scheduled tylenol, PRN oxycodone, dilaudid    ABNL VS/O2: VSS on RA   Tele: N/A   ABNL Lab/BG: CRP 42.91    Diet: Regular   Bowel/Bladder: Continent    Skin:  L hand wrapped with gauze and ace wrap, dressing changed this evening   Drains/Devices:  R PIV SL - Intermittent IV antibiotics, will need PICC for long term antibiotics at discharge  Tests/Procedures: N/A   Anticipated DC Date: Possible tomorrow   Other Important Info:

## 2025-01-05 NOTE — PROGRESS NOTES
Care Management Discharge Note    Discharge Date: 01/06/2025       Discharge Disposition: Home, Home Infusion    Discharge Services:  home infusion    Discharge DME:      Discharge Transportation: family or friend will provide    Private pay costs discussed:  private pay cost for home infusion    Does the patient's insurance plan have a 3 day qualifying hospital stay waiver?  No    PAS Confirmation Code:    Patient/family educated on Medicare website which has current facility and service quality ratings:      Education Provided on the Discharge Plan:    Persons Notified of Discharge Plans: pt, significant other VICENTE Romero home infusion nurse, hospitalist, bedside RN  Patient/Family in Agreement with the Plan: yes    Handoff Referral Completed: No, handoff not indicated or clinically appropriate    Additional Information:  Pt wanting to discharge today.  Agreeable to private pay cost for home infusion.  Pt to reach out to billing office at  home infusion to discuss payment options.    Pt to get antibiotic dose this evening and have midline placed today prior to discharge.   home infusion to call pt later today or in the morning to coordinate delivery of medication/supplies to his home and will do home infusion teaching with nurse tomorrow at his home.    Frieda Melendez RN, BS  Care Coordinator  [email or web address]

## 2025-01-05 NOTE — PROCEDURES
[facility]    Single Lumen Midline Placement    Date/Time: 1/5/2025 2:20 PM    Performed by: Gretchen Saldana RN  Authorized by: Germain Villareal DO  Indications: vascular access      UNIVERSAL PROTOCOL   Site Marked: Yes  Prior Images Obtained and Reviewed:  Yes  Required items: Required blood products, implants, devices and special equipment available    Patient identity confirmed:  Verbally with patient, arm band and hospital-assigned identification number  NA - No sedation, light sedation, or local anesthesia  Confirmation Checklist:  Patient's identity using two indicators, relevant allergies, procedure was appropriate and matched the consent or emergent situation and correct equipment/implants were available  Time out: Immediately prior to the procedure a time out was called    Universal Protocol: the Joint Commission Universal Protocol was followed    Preparation: Patient was prepped and draped in usual sterile fashion    ESBL (mL):  0.5     ANESTHESIA    Anesthesia:  Local infiltration  Local Anesthetic:  Lidocaine 1% without epinephrine  Anesthetic Total (mL):  1      SEDATION    Patient Sedated: No        Preparation: skin prepped with ChloraPrep  Skin prep agent: skin prep agent completely dried prior to procedure  Sterile barriers: maximum sterile barriers were used: cap, mask, sterile gown, sterile gloves, and large sterile sheet  Hand hygiene: hand hygiene performed prior to central venous catheter insertion  Type of line used: Midline  Catheter type: single lumen  Lumen type: non-valved  Catheter size: 4 Fr  Brand: Bard  Lot number: [id number]  Placement method: venipuncture and ultrasound  Number of attempts: 1  Difficulty threading catheter: no  Successful placement: yes  Orientation: right    Location: basilic vein  Tip Location: distal to axillary vein  Arm circumference: adults 10 cm  Extremity circumference: 34  Visible catheter length: 0  Internal length: 15 cm  Total  catheter length: 15  Dressing and securement: chlorhexidine patch applied, alcohol impregnated caps, occlusive dressing applied, blood cleaned with CHG, blood removed, statlock and site cleansed  Post procedure assessment: blood return through all ports  PROCEDURE   Patient Tolerance:  Patient tolerated the procedure well with no immediate complicationsDescribe Procedure: Patient instruct on Midline placement. Patient verbalized an understanding. Patient gave verbal consent for placement. VAT RN placed 4 FR midline catheter in right basilic. Patient tolerated well. Blood return noted. RN caring for patient updated that Midline is ok to use.

## 2025-01-05 NOTE — PLAN OF CARE
Date & Time: 1/5/2024 7845-3074  Surgery/POD#: POD3: LEFT INDEX FINGER FLEXOR TENDON SHEATH IRRIGATION AND DORSAL HAND IRRIGATION AND DEBRIDEMENT  Behavior & Aggression: Green  Fall Risk: No  Orientation:A&Ox4  ABNL VS/O2:VSS RA  ABNL Labs: see results  Pain Management: Scheduled Tylenol, PRN Oxy (10 mg) & Dilaudid (0.5 mg)  Bowel/Bladder: Continent of B&B  Drains: R PIV SL, Int Antibiotics, Will discharge home with PICC for long term antibiotics  Wounds/incisions: Anterior and posterior incisions on L hand. Wrapped in gauze and ace bandage  Diet:Reg  Activity Level: Ind  Tests/Procedures: n/a  Anticipated  DC Date: Pending  Significant Information:

## 2025-01-06 ENCOUNTER — HOME CARE VISIT (OUTPATIENT)
Dept: HOME HEALTH SERVICES | Facility: HOME HEALTH | Age: 30
End: 2025-01-06

## 2025-01-06 ENCOUNTER — HOME INFUSION BILLING (OUTPATIENT)
Dept: HOME HEALTH SERVICES | Facility: HOME HEALTH | Age: 30
End: 2025-01-06

## 2025-01-06 VITALS
WEIGHT: 227.07 LBS | TEMPERATURE: 97.7 F | RESPIRATION RATE: 16 BRPM | OXYGEN SATURATION: 96 % | HEART RATE: 86 BPM | BODY MASS INDEX: 30.8 KG/M2 | DIASTOLIC BLOOD PRESSURE: 100 MMHG | SYSTOLIC BLOOD PRESSURE: 155 MMHG

## 2025-01-06 LAB
BACTERIA BLD CULT: NO GROWTH
BACTERIA BLD CULT: NO GROWTH

## 2025-01-06 PROCEDURE — A9270 NON-COVERED ITEM OR SERVICE: HCPCS

## 2025-01-06 PROCEDURE — A4452 WATERPROOF TAPE: HCPCS

## 2025-01-06 PROCEDURE — S9500 HIT ANTIBIOTIC Q24H DIEM: HCPCS

## 2025-01-06 PROCEDURE — A4245 ALCOHOL WIPES PER BOX: HCPCS

## 2025-01-06 NOTE — PROGRESS NOTES
Patient discharged home. Single lumen midline in place for home Abx. Pt discharged with medications, all belongings, and AVS printed.

## 2025-01-06 NOTE — PROGRESS NOTES
Education Provided:     Patient Education focused on invanz administration using elastomeric pump. Pt. verbalized understanding of proper technique of aseptic administration of Invanz. Appears ready and able to perform self administered invanz. Pt. informed of risks and complications of midline as well as medication and steps to take if anything happens and/or how to prevent. Pt VSS and oriented during visit. Elevated BP, pt reports not taking BP meds due to drops in BP while taking pain medication. Resuming BP medication today. No other concerns at this time. .     Saline administered (in mLs): 20    Next visit plan 1/7/25 for CLC and labs.       Charley Coffman, RN 01/06/25

## 2025-01-07 ENCOUNTER — HOME CARE VISIT (OUTPATIENT)
Dept: HOME HEALTH SERVICES | Facility: HOME HEALTH | Age: 30
End: 2025-01-07

## 2025-01-07 ENCOUNTER — LAB REQUISITION (OUTPATIENT)
Dept: LAB | Facility: CLINIC | Age: 30
End: 2025-01-07

## 2025-01-07 VITALS
RESPIRATION RATE: 16 BRPM | OXYGEN SATURATION: 96 % | HEART RATE: 68 BPM | TEMPERATURE: 98.6 F | DIASTOLIC BLOOD PRESSURE: 72 MMHG | SYSTOLIC BLOOD PRESSURE: 126 MMHG

## 2025-01-07 DIAGNOSIS — M65.949 UNSPECIFIED SYNOVITIS AND TENOSYNOVITIS, UNSPECIFIED HAND: ICD-10-CM

## 2025-01-07 LAB
AST SERPL W P-5'-P-CCNC: 42 U/L (ref 0–45)
BACTERIA TISS BX CULT: ABNORMAL
BASOPHILS # BLD AUTO: 0.1 10E3/UL (ref 0–0.2)
BASOPHILS NFR BLD AUTO: 0 %
CREAT SERPL-MCNC: 1.24 MG/DL (ref 0.67–1.17)
CRP SERPL-MCNC: 29.54 MG/L
EGFRCR SERPLBLD CKD-EPI 2021: 81 ML/MIN/1.73M2
EOSINOPHIL # BLD AUTO: 0.4 10E3/UL (ref 0–0.7)
EOSINOPHIL NFR BLD AUTO: 3 %
ERYTHROCYTE [DISTWIDTH] IN BLOOD BY AUTOMATED COUNT: 13.5 % (ref 10–15)
ERYTHROCYTE [SEDIMENTATION RATE] IN BLOOD BY WESTERGREN METHOD: 9 MM/HR (ref 0–15)
HCT VFR BLD AUTO: 54.3 % (ref 40–53)
HGB BLD-MCNC: 17.9 G/DL (ref 13.3–17.7)
IMM GRANULOCYTES # BLD: 0 10E3/UL
IMM GRANULOCYTES NFR BLD: 0 %
LYMPHOCYTES # BLD AUTO: 2.8 10E3/UL (ref 0.8–5.3)
LYMPHOCYTES NFR BLD AUTO: 23 %
MCH RBC QN AUTO: 29.3 PG (ref 26.5–33)
MCHC RBC AUTO-ENTMCNC: 33 G/DL (ref 31.5–36.5)
MCV RBC AUTO: 89 FL (ref 78–100)
MONOCYTES # BLD AUTO: 1 10E3/UL (ref 0–1.3)
MONOCYTES NFR BLD AUTO: 8 %
NEUTROPHILS # BLD AUTO: 7.7 10E3/UL (ref 1.6–8.3)
NEUTROPHILS NFR BLD AUTO: 65 %
NRBC # BLD AUTO: 0 10E3/UL
NRBC BLD AUTO-RTO: 0 /100
PLATELET # BLD AUTO: 504 10E3/UL (ref 150–450)
RBC # BLD AUTO: 6.1 10E6/UL (ref 4.4–5.9)
WBC # BLD AUTO: 12 10E3/UL (ref 4–11)

## 2025-01-07 PROCEDURE — 85004 AUTOMATED DIFF WBC COUNT: CPT | Performed by: INTERNAL MEDICINE

## 2025-01-07 PROCEDURE — 86140 C-REACTIVE PROTEIN: CPT | Performed by: INTERNAL MEDICINE

## 2025-01-07 PROCEDURE — 82565 ASSAY OF CREATININE: CPT | Performed by: INTERNAL MEDICINE

## 2025-01-07 PROCEDURE — 85014 HEMATOCRIT: CPT | Performed by: INTERNAL MEDICINE

## 2025-01-07 PROCEDURE — S9500 HIT ANTIBIOTIC Q24H DIEM: HCPCS

## 2025-01-07 PROCEDURE — 84450 TRANSFERASE (AST) (SGOT): CPT | Performed by: INTERNAL MEDICINE

## 2025-01-07 PROCEDURE — 85652 RBC SED RATE AUTOMATED: CPT | Performed by: INTERNAL MEDICINE

## 2025-01-08 ENCOUNTER — HOME INFUSION BILLING (OUTPATIENT)
Dept: HOME HEALTH SERVICES | Facility: HOME HEALTH | Age: 30
End: 2025-01-08

## 2025-01-08 PROCEDURE — S9500 HIT ANTIBIOTIC Q24H DIEM: HCPCS

## 2025-01-08 NOTE — PROGRESS NOTES
Nursing Visit Note:  Nurse visit today for GA, CLC, and labs for Benny Fofana.     present during visit today: Not Applicable.    Lab-Only Nursing Note    Labs obtained via VPT    [id number]    Facility sent to: [facility]    Saline administered (in mLs): 30mL    Next Visit Plan:   7/14/25 for GA, CLC, and Labs    Popeye Browning RN 1/7/2025   and Benny had a pleasant affect and reported feeling well. He says his hand pain is minimal and he is not taking anything for pain currently. Afebrile and VS. Denies any current medical complaints. ML care completed per protocol and pt tolerated interventions well. When asked how his hand dressing changes were going, he said he has not changed his dressing since his discharge on Sunday. This writer reviewed DC instructions for wound care with pt and his fiancé, who will be helping with care. This writer demonstrated process with both parties and stressed that changes be done daily - as ordered. Incision was approximated and clear of s/s of infection. Both parties were comfortable with dressing changes after materials were reviewed and skills were demonstrated. Instructed to call his surgeons office with any questions related to would issues/wound care.       Popeye Browning RN 1/7/2025

## 2025-01-09 LAB
BACTERIA TISS BX CULT: ABNORMAL

## 2025-01-10 ENCOUNTER — HOME INFUSION BILLING (OUTPATIENT)
Dept: HOME HEALTH SERVICES | Facility: HOME HEALTH | Age: 30
End: 2025-01-10

## 2025-01-14 ENCOUNTER — HOME INFUSION BILLING (OUTPATIENT)
Dept: HOME HEALTH SERVICES | Facility: HOME HEALTH | Age: 30
End: 2025-01-14

## 2025-01-14 ENCOUNTER — LAB REQUISITION (OUTPATIENT)
Dept: LAB | Facility: CLINIC | Age: 30
End: 2025-01-14

## 2025-01-14 ENCOUNTER — HOME CARE VISIT (OUTPATIENT)
Dept: HOME HEALTH SERVICES | Facility: HOME HEALTH | Age: 30
End: 2025-01-14

## 2025-01-14 VITALS
SYSTOLIC BLOOD PRESSURE: 124 MMHG | TEMPERATURE: 98.2 F | OXYGEN SATURATION: 96 % | HEART RATE: 72 BPM | RESPIRATION RATE: 16 BRPM | DIASTOLIC BLOOD PRESSURE: 80 MMHG

## 2025-01-14 DIAGNOSIS — M65.949 UNSPECIFIED SYNOVITIS AND TENOSYNOVITIS, UNSPECIFIED HAND: ICD-10-CM

## 2025-01-14 LAB
AST SERPL W P-5'-P-CCNC: 26 U/L (ref 0–45)
BASOPHILS # BLD AUTO: 0.1 10E3/UL (ref 0–0.2)
BASOPHILS NFR BLD AUTO: 1 %
CREAT SERPL-MCNC: 1.09 MG/DL (ref 0.67–1.17)
CRP SERPL-MCNC: 19.83 MG/L
EGFRCR SERPLBLD CKD-EPI 2021: >90 ML/MIN/1.73M2
EOSINOPHIL # BLD AUTO: 0.3 10E3/UL (ref 0–0.7)
EOSINOPHIL NFR BLD AUTO: 3 %
ERYTHROCYTE [DISTWIDTH] IN BLOOD BY AUTOMATED COUNT: 13 % (ref 10–15)
ERYTHROCYTE [SEDIMENTATION RATE] IN BLOOD BY WESTERGREN METHOD: 8 MM/HR (ref 0–15)
HCT VFR BLD AUTO: 46.2 % (ref 40–53)
HGB BLD-MCNC: 15.7 G/DL (ref 13.3–17.7)
HOLD SPECIMEN: NORMAL
IMM GRANULOCYTES # BLD: 0.1 10E3/UL
IMM GRANULOCYTES NFR BLD: 0 %
LYMPHOCYTES # BLD AUTO: 2.9 10E3/UL (ref 0.8–5.3)
LYMPHOCYTES NFR BLD AUTO: 23 %
MCH RBC QN AUTO: 30 PG (ref 26.5–33)
MCHC RBC AUTO-ENTMCNC: 34 G/DL (ref 31.5–36.5)
MCV RBC AUTO: 88 FL (ref 78–100)
MONOCYTES # BLD AUTO: 1.1 10E3/UL (ref 0–1.3)
MONOCYTES NFR BLD AUTO: 8 %
NEUTROPHILS # BLD AUTO: 8.3 10E3/UL (ref 1.6–8.3)
NEUTROPHILS NFR BLD AUTO: 65 %
NRBC # BLD AUTO: 0 10E3/UL
NRBC BLD AUTO-RTO: 0 /100
PLATELET # BLD AUTO: 442 10E3/UL (ref 150–450)
RBC # BLD AUTO: 5.23 10E6/UL (ref 4.4–5.9)
WBC # BLD AUTO: 12.8 10E3/UL (ref 4–11)

## 2025-01-14 PROCEDURE — 85004 AUTOMATED DIFF WBC COUNT: CPT | Performed by: INTERNAL MEDICINE

## 2025-01-14 PROCEDURE — 85652 RBC SED RATE AUTOMATED: CPT | Performed by: INTERNAL MEDICINE

## 2025-01-14 PROCEDURE — 82565 ASSAY OF CREATININE: CPT | Performed by: INTERNAL MEDICINE

## 2025-01-14 PROCEDURE — 84450 TRANSFERASE (AST) (SGOT): CPT | Performed by: INTERNAL MEDICINE

## 2025-01-14 PROCEDURE — 86140 C-REACTIVE PROTEIN: CPT | Performed by: INTERNAL MEDICINE

## 2025-01-14 NOTE — PROGRESS NOTES
Lab-Only Nursing Note    Labs [facility] via VPT    [id number]    Facility sent to: [facility]    Saline administered (in mLs): 20    Next Visit Plan:   CLC and labs 1/21/25    Charley Coffman RN 1/14/2025

## 2025-01-14 NOTE — PROGRESS NOTES
Nursing Visit Note:  Nurse visit today for CLC and labs for Benny Duval Brigido.     present during visit today: Not Applicable.    Patient VSS and oriented during visit. Labs drawn peripherally without trouble. Dressing change done, skin clear and intact under dressing and free of S/S of infection. Medication administration going well. No other concerns at this time.       Charley Coffman RN 1/14/2025

## 2025-01-15 PROCEDURE — S9500 HIT ANTIBIOTIC Q24H DIEM: HCPCS

## 2025-01-16 PROCEDURE — S9500 HIT ANTIBIOTIC Q24H DIEM: HCPCS

## 2025-01-17 ENCOUNTER — HOME INFUSION BILLING (OUTPATIENT)
Dept: HOME HEALTH SERVICES | Facility: HOME HEALTH | Age: 30
End: 2025-01-17

## 2025-01-17 PROCEDURE — S9500 HIT ANTIBIOTIC Q24H DIEM: HCPCS

## 2025-01-21 ENCOUNTER — HOME CARE VISIT (OUTPATIENT)
Dept: HOME HEALTH SERVICES | Facility: HOME HEALTH | Age: 30
End: 2025-01-21

## 2025-01-21 ENCOUNTER — HOME INFUSION BILLING (OUTPATIENT)
Dept: HOME HEALTH SERVICES | Facility: HOME HEALTH | Age: 30
End: 2025-01-21

## 2025-01-21 ENCOUNTER — LAB REQUISITION (OUTPATIENT)
Dept: LAB | Facility: CLINIC | Age: 30
End: 2025-01-21

## 2025-01-21 VITALS
SYSTOLIC BLOOD PRESSURE: 130 MMHG | TEMPERATURE: 100 F | DIASTOLIC BLOOD PRESSURE: 72 MMHG | OXYGEN SATURATION: 98 % | RESPIRATION RATE: 16 BRPM | HEART RATE: 70 BPM

## 2025-01-21 DIAGNOSIS — M65.949 UNSPECIFIED SYNOVITIS AND TENOSYNOVITIS, UNSPECIFIED HAND: ICD-10-CM

## 2025-01-21 LAB
AST SERPL W P-5'-P-CCNC: 29 U/L (ref 0–45)
BASOPHILS # BLD AUTO: 0.1 10E3/UL (ref 0–0.2)
BASOPHILS NFR BLD AUTO: 1 %
CREAT SERPL-MCNC: 1.15 MG/DL (ref 0.67–1.17)
CRP SERPL-MCNC: 11 MG/L
EGFRCR SERPLBLD CKD-EPI 2021: 88 ML/MIN/1.73M2
EOSINOPHIL # BLD AUTO: 0.3 10E3/UL (ref 0–0.7)
EOSINOPHIL NFR BLD AUTO: 4 %
ERYTHROCYTE [DISTWIDTH] IN BLOOD BY AUTOMATED COUNT: 13.2 % (ref 10–15)
ERYTHROCYTE [SEDIMENTATION RATE] IN BLOOD BY WESTERGREN METHOD: 8 MM/HR (ref 0–15)
HCT VFR BLD AUTO: 45.9 % (ref 40–53)
HGB BLD-MCNC: 15.4 G/DL (ref 13.3–17.7)
IMM GRANULOCYTES # BLD: 0 10E3/UL
IMM GRANULOCYTES NFR BLD: 0 %
LYMPHOCYTES # BLD AUTO: 2.8 10E3/UL (ref 0.8–5.3)
LYMPHOCYTES NFR BLD AUTO: 30 %
MCH RBC QN AUTO: 30 PG (ref 26.5–33)
MCHC RBC AUTO-ENTMCNC: 33.6 G/DL (ref 31.5–36.5)
MCV RBC AUTO: 89 FL (ref 78–100)
MONOCYTES # BLD AUTO: 0.5 10E3/UL (ref 0–1.3)
MONOCYTES NFR BLD AUTO: 6 %
NEUTROPHILS # BLD AUTO: 5.7 10E3/UL (ref 1.6–8.3)
NEUTROPHILS NFR BLD AUTO: 60 %
NRBC # BLD AUTO: 0 10E3/UL
NRBC BLD AUTO-RTO: 0 /100
PLATELET # BLD AUTO: 401 10E3/UL (ref 150–450)
RBC # BLD AUTO: 5.14 10E6/UL (ref 4.4–5.9)
WBC # BLD AUTO: 9.4 10E3/UL (ref 4–11)

## 2025-01-21 PROCEDURE — 84450 TRANSFERASE (AST) (SGOT): CPT | Performed by: INTERNAL MEDICINE

## 2025-01-21 PROCEDURE — 85652 RBC SED RATE AUTOMATED: CPT | Performed by: INTERNAL MEDICINE

## 2025-01-21 PROCEDURE — A9270 NON-COVERED ITEM OR SERVICE: HCPCS

## 2025-01-21 PROCEDURE — 85041 AUTOMATED RBC COUNT: CPT | Performed by: INTERNAL MEDICINE

## 2025-01-21 PROCEDURE — 86140 C-REACTIVE PROTEIN: CPT | Performed by: INTERNAL MEDICINE

## 2025-01-21 PROCEDURE — 85004 AUTOMATED DIFF WBC COUNT: CPT | Performed by: INTERNAL MEDICINE

## 2025-01-21 PROCEDURE — 82565 ASSAY OF CREATININE: CPT | Performed by: INTERNAL MEDICINE

## 2025-01-21 NOTE — PROGRESS NOTES
Nursing Visit Note:  Nurse visit today for ML dressing change and GA for Benny Fofana.     present during visit today: Not Applicable.    Intravenous Access:  Midline.    Lab-Only Nursing Note    Labs obtained via VPT    Time Specimen drawn: 1730    Last dose (if applicable): No    [facility] [place] Tr[facility] number: [id number]    Note: Benny reports feeling well and denies any pain or current medical complaints. The staples were removed from his left hand incision, and it is now DANIELLE. Wound is approximated except for a 2cm portion on the skin between his left thumb and pointer finger. ML dressing changed and labs drawn. Pt tolerated interventions well.    Saline administered: 30mL (ml)    Supply Check:   Does the patient have all the supplies they need for the next visit?  Yes    Next visit plan: Lot estimated 1/25 - end of therapy vs weekly SNV    Popeye Browning, RN 1/21/2025

## 2025-05-02 ENCOUNTER — HOSPITAL ENCOUNTER (EMERGENCY)
Facility: CLINIC | Age: 30
Discharge: HOME | End: 2025-05-03
Admitting: EMERGENCY MEDICINE
Payer: COMMERCIAL

## 2025-05-02 VITALS
RESPIRATION RATE: 20 BRPM | HEIGHT: 74 IN | TEMPERATURE: 97.7 F | DIASTOLIC BLOOD PRESSURE: 90 MMHG | OXYGEN SATURATION: 99 % | BODY MASS INDEX: 29.15 KG/M2 | SYSTOLIC BLOOD PRESSURE: 156 MMHG | HEART RATE: 95 BPM

## 2025-05-02 PROCEDURE — 99281 EMR DPT VST MAYX REQ PHY/QHP: CPT

## 2025-05-02 PROCEDURE — 93005 ELECTROCARDIOGRAM TRACING: CPT

## 2025-05-02 RX ORDER — TELMISARTAN 80 MG/1
TABLET ORAL
COMMUNITY
Start: 2025-04-29

## 2025-05-02 ASSESSMENT — ACTIVITIES OF DAILY LIVING (ADL)
ADLS_ACUITY_SCORE: 52
ADLS_ACUITY_SCORE: 52

## 2025-05-02 ASSESSMENT — COLUMBIA-SUICIDE SEVERITY RATING SCALE - C-SSRS
1. IN THE PAST MONTH, HAVE YOU WISHED YOU WERE DEAD OR WISHED YOU COULD GO TO SLEEP AND NOT WAKE UP?: NO
2. HAVE YOU ACTUALLY HAD ANY THOUGHTS OF KILLING YOURSELF IN THE PAST MONTH?: NO
6. HAVE YOU EVER DONE ANYTHING, STARTED TO DO ANYTHING, OR PREPARED TO DO ANYTHING TO END YOUR LIFE?: NO

## 2025-05-03 LAB
ATRIAL RATE - MUSE: 105 BPM
DIASTOLIC BLOOD PRESSURE - MUSE: NORMAL MMHG
INTERPRETATION ECG - MUSE: NORMAL
P AXIS - MUSE: 71 DEGREES
PR INTERVAL - MUSE: 148 MS
QRS DURATION - MUSE: 94 MS
QT - MUSE: 346 MS
QTC - MUSE: 457 MS
R AXIS - MUSE: 40 DEGREES
SYSTOLIC BLOOD PRESSURE - MUSE: NORMAL MMHG
T AXIS - MUSE: 33 DEGREES
VENTRICULAR RATE- MUSE: 105 BPM

## 2025-05-03 NOTE — ED TRIAGE NOTES
"Pt reports using meth intravenously an hour ago - c/o palpitations, lightheadedness and \"feeling tense\"        "

## (undated) DEVICE — SUCTION MANIFOLD NEPTUNE 2 SYS 4 PORT 0702-020-000

## (undated) DEVICE — PREP CHLORAPREP 26ML TINTED HI-LITE ORANGE 930815

## (undated) DEVICE — SOL WATER IRRIG 1000ML BOTTLE 2F7114

## (undated) DEVICE — TUBE FEEDING NEOCONNECT POLY ENFIT 8FR 40CM PFTS8.0P-NC

## (undated) DEVICE — LINEN TOWEL PACK X5 5464

## (undated) DEVICE — ESU GROUND PAD ADULT W/CORD E7507

## (undated) DEVICE — PACKING NUGAUZE 1/4" PLAIN 7631

## (undated) DEVICE — BLADE KNIFE SURG 15 371115

## (undated) DEVICE — SU ETHILON 4-0 FS-2 18" 662H

## (undated) DEVICE — SOL NACL 0.9% IRRIG 1000ML BOTTLE 2F7124

## (undated) DEVICE — NDL ANGIOCATH 18GA 1.25" 4055

## (undated) DEVICE — PACK HAND WRIST SOP15HWFSP

## (undated) DEVICE — KIT CULTURE ESWAB AEROBE/ANAEROBE WHITE TOP R723480

## (undated) DEVICE — BNDG ELASTIC 4"X5YDS UNSTERILE 6611-40

## (undated) DEVICE — DRSG GAUZE 4X4" 3033

## (undated) RX ORDER — PROPOFOL 10 MG/ML
INJECTION, EMULSION INTRAVENOUS
Status: DISPENSED
Start: 2025-01-02

## (undated) RX ORDER — HYDROMORPHONE HYDROCHLORIDE 1 MG/ML
INJECTION, SOLUTION INTRAMUSCULAR; INTRAVENOUS; SUBCUTANEOUS
Status: DISPENSED
Start: 2025-01-02

## (undated) RX ORDER — FENTANYL CITRATE 0.05 MG/ML
INJECTION, SOLUTION INTRAMUSCULAR; INTRAVENOUS
Status: DISPENSED
Start: 2025-01-02

## (undated) RX ORDER — DEXAMETHASONE SODIUM PHOSPHATE 4 MG/ML
INJECTION, SOLUTION INTRA-ARTICULAR; INTRALESIONAL; INTRAMUSCULAR; INTRAVENOUS; SOFT TISSUE
Status: DISPENSED
Start: 2025-01-02

## (undated) RX ORDER — BUPIVACAINE HYDROCHLORIDE 2.5 MG/ML
INJECTION, SOLUTION EPIDURAL; INFILTRATION; INTRACAUDAL
Status: DISPENSED
Start: 2025-01-02

## (undated) RX ORDER — FENTANYL CITRATE 50 UG/ML
INJECTION, SOLUTION INTRAMUSCULAR; INTRAVENOUS
Status: DISPENSED
Start: 2025-01-02